# Patient Record
Sex: MALE | Race: OTHER | NOT HISPANIC OR LATINO
[De-identification: names, ages, dates, MRNs, and addresses within clinical notes are randomized per-mention and may not be internally consistent; named-entity substitution may affect disease eponyms.]

---

## 2018-12-08 ENCOUNTER — TRANSCRIPTION ENCOUNTER (OUTPATIENT)
Age: 48
End: 2018-12-08

## 2019-01-01 ENCOUNTER — TRANSCRIPTION ENCOUNTER (OUTPATIENT)
Age: 49
End: 2019-01-01

## 2020-02-24 ENCOUNTER — TRANSCRIPTION ENCOUNTER (OUTPATIENT)
Age: 50
End: 2020-02-24

## 2024-02-21 ENCOUNTER — NON-APPOINTMENT (OUTPATIENT)
Age: 54
End: 2024-02-21

## 2024-03-11 ENCOUNTER — NON-APPOINTMENT (OUTPATIENT)
Age: 54
End: 2024-03-11

## 2024-04-22 ENCOUNTER — EMERGENCY (EMERGENCY)
Facility: HOSPITAL | Age: 54
LOS: 1 days | Discharge: ANOTHER HOSPITAL | End: 2024-04-22
Attending: EMERGENCY MEDICINE | Admitting: EMERGENCY MEDICINE
Payer: COMMERCIAL

## 2024-04-22 ENCOUNTER — INPATIENT (INPATIENT)
Facility: HOSPITAL | Age: 54
LOS: 8 days | Discharge: HOME | DRG: 242 | End: 2024-05-01
Attending: INTERNAL MEDICINE | Admitting: INTERNAL MEDICINE
Payer: COMMERCIAL

## 2024-04-22 VITALS
HEIGHT: 70 IN | SYSTOLIC BLOOD PRESSURE: 117 MMHG | HEART RATE: 40 BPM | OXYGEN SATURATION: 98 % | TEMPERATURE: 98 F | DIASTOLIC BLOOD PRESSURE: 74 MMHG | WEIGHT: 190.04 LBS | RESPIRATION RATE: 16 BRPM

## 2024-04-22 VITALS
OXYGEN SATURATION: 98 % | RESPIRATION RATE: 18 BRPM | SYSTOLIC BLOOD PRESSURE: 140 MMHG | WEIGHT: 184.97 LBS | HEART RATE: 40 BPM | HEIGHT: 70 IN | DIASTOLIC BLOOD PRESSURE: 77 MMHG

## 2024-04-22 DIAGNOSIS — I44.2 ATRIOVENTRICULAR BLOCK, COMPLETE: ICD-10-CM

## 2024-04-22 LAB
ALBUMIN SERPL ELPH-MCNC: 3.3 G/DL — SIGNIFICANT CHANGE UP (ref 3.3–5)
ALBUMIN SERPL ELPH-MCNC: 4 G/DL — SIGNIFICANT CHANGE UP (ref 3.3–5)
ALP SERPL-CCNC: 69 U/L — SIGNIFICANT CHANGE UP (ref 40–120)
ALP SERPL-CCNC: 84 U/L — SIGNIFICANT CHANGE UP (ref 40–120)
ALT FLD-CCNC: 105 U/L — HIGH (ref 12–78)
ALT FLD-CCNC: 96 U/L — HIGH (ref 10–45)
ANION GAP SERPL CALC-SCNC: 12 MMOL/L — SIGNIFICANT CHANGE UP (ref 5–17)
ANION GAP SERPL CALC-SCNC: 4 MMOL/L — LOW (ref 5–17)
APTT BLD: 27.1 SEC — SIGNIFICANT CHANGE UP (ref 24.5–35.6)
AST SERPL-CCNC: 22 U/L — SIGNIFICANT CHANGE UP (ref 15–37)
AST SERPL-CCNC: 36 U/L — SIGNIFICANT CHANGE UP (ref 10–40)
BASE EXCESS BLDV CALC-SCNC: 1 MMOL/L — SIGNIFICANT CHANGE UP (ref -2–3)
BASOPHILS # BLD AUTO: 0.03 K/UL — SIGNIFICANT CHANGE UP (ref 0–0.2)
BASOPHILS # BLD AUTO: 0.04 K/UL — SIGNIFICANT CHANGE UP (ref 0–0.2)
BASOPHILS NFR BLD AUTO: 0.2 % — SIGNIFICANT CHANGE UP (ref 0–2)
BASOPHILS NFR BLD AUTO: 0.3 % — SIGNIFICANT CHANGE UP (ref 0–2)
BILIRUB SERPL-MCNC: 0.7 MG/DL — SIGNIFICANT CHANGE UP (ref 0.2–1.2)
BILIRUB SERPL-MCNC: 0.7 MG/DL — SIGNIFICANT CHANGE UP (ref 0.2–1.2)
BUN SERPL-MCNC: 11 MG/DL — SIGNIFICANT CHANGE UP (ref 7–23)
BUN SERPL-MCNC: 11 MG/DL — SIGNIFICANT CHANGE UP (ref 7–23)
CA-I SERPL-SCNC: 1.25 MMOL/L — SIGNIFICANT CHANGE UP (ref 1.15–1.33)
CALCIUM SERPL-MCNC: 8.4 MG/DL — LOW (ref 8.5–10.1)
CALCIUM SERPL-MCNC: 9.3 MG/DL — SIGNIFICANT CHANGE UP (ref 8.4–10.5)
CHLORIDE BLDV-SCNC: 108 MMOL/L — SIGNIFICANT CHANGE UP (ref 96–108)
CHLORIDE SERPL-SCNC: 110 MMOL/L — HIGH (ref 96–108)
CHLORIDE SERPL-SCNC: 113 MMOL/L — HIGH (ref 96–108)
CK MB BLD-MCNC: <1.2 % — SIGNIFICANT CHANGE UP (ref 0–3.5)
CK MB CFR SERPL CALC: 1.6 NG/ML — SIGNIFICANT CHANGE UP (ref 0–6.7)
CK MB CFR SERPL CALC: <1 NG/ML — SIGNIFICANT CHANGE UP (ref 0–3.6)
CK SERPL-CCNC: 81 U/L — SIGNIFICANT CHANGE UP (ref 26–308)
CK SERPL-CCNC: 95 U/L — SIGNIFICANT CHANGE UP (ref 30–200)
CO2 BLDV-SCNC: 26 MMOL/L — SIGNIFICANT CHANGE UP (ref 22–26)
CO2 SERPL-SCNC: 21 MMOL/L — LOW (ref 22–31)
CO2 SERPL-SCNC: 27 MMOL/L — SIGNIFICANT CHANGE UP (ref 22–31)
CREAT SERPL-MCNC: 0.92 MG/DL — SIGNIFICANT CHANGE UP (ref 0.5–1.3)
CREAT SERPL-MCNC: 1 MG/DL — SIGNIFICANT CHANGE UP (ref 0.5–1.3)
EGFR: 89 ML/MIN/1.73M2 — SIGNIFICANT CHANGE UP
EGFR: 99 ML/MIN/1.73M2 — SIGNIFICANT CHANGE UP
EOSINOPHIL # BLD AUTO: 0.21 K/UL — SIGNIFICANT CHANGE UP (ref 0–0.5)
EOSINOPHIL # BLD AUTO: 0.22 K/UL — SIGNIFICANT CHANGE UP (ref 0–0.5)
EOSINOPHIL NFR BLD AUTO: 1.4 % — SIGNIFICANT CHANGE UP (ref 0–6)
EOSINOPHIL NFR BLD AUTO: 1.7 % — SIGNIFICANT CHANGE UP (ref 0–6)
GAS PNL BLDV: 139 MMOL/L — SIGNIFICANT CHANGE UP (ref 136–145)
GAS PNL BLDV: SIGNIFICANT CHANGE UP
GLUCOSE BLDV-MCNC: 94 MG/DL — SIGNIFICANT CHANGE UP (ref 70–99)
GLUCOSE SERPL-MCNC: 85 MG/DL — SIGNIFICANT CHANGE UP (ref 70–99)
GLUCOSE SERPL-MCNC: 87 MG/DL — SIGNIFICANT CHANGE UP (ref 70–99)
HCO3 BLDV-SCNC: 25 MMOL/L — SIGNIFICANT CHANGE UP (ref 22–29)
HCT VFR BLD CALC: 42 % — SIGNIFICANT CHANGE UP (ref 39–50)
HCT VFR BLD CALC: 43.7 % — SIGNIFICANT CHANGE UP (ref 39–50)
HCT VFR BLDA CALC: 45 % — SIGNIFICANT CHANGE UP (ref 39–51)
HGB BLD CALC-MCNC: 14.9 G/DL — SIGNIFICANT CHANGE UP (ref 12.6–17.4)
HGB BLD-MCNC: 14.1 G/DL — SIGNIFICANT CHANGE UP (ref 13–17)
HGB BLD-MCNC: 14.8 G/DL — SIGNIFICANT CHANGE UP (ref 13–17)
IMM GRANULOCYTES NFR BLD AUTO: 0.5 % — SIGNIFICANT CHANGE UP (ref 0–0.9)
IMM GRANULOCYTES NFR BLD AUTO: 0.5 % — SIGNIFICANT CHANGE UP (ref 0–0.9)
INR BLD: 0.96 RATIO — SIGNIFICANT CHANGE UP (ref 0.85–1.18)
LACTATE BLDV-MCNC: 2.2 MMOL/L — HIGH (ref 0.5–2)
LACTATE SERPL-SCNC: 2.3 MMOL/L — HIGH (ref 0.5–2)
LIDOCAIN IGE QN: 17 U/L — SIGNIFICANT CHANGE UP (ref 7–60)
LIDOCAIN IGE QN: 19 U/L — SIGNIFICANT CHANGE UP (ref 13–75)
LYMPHOCYTES # BLD AUTO: 2.88 K/UL — SIGNIFICANT CHANGE UP (ref 1–3.3)
LYMPHOCYTES # BLD AUTO: 21.7 % — SIGNIFICANT CHANGE UP (ref 13–44)
LYMPHOCYTES # BLD AUTO: 21.8 % — SIGNIFICANT CHANGE UP (ref 13–44)
LYMPHOCYTES # BLD AUTO: 3.32 K/UL — HIGH (ref 1–3.3)
MAGNESIUM SERPL-MCNC: 2.2 MG/DL — SIGNIFICANT CHANGE UP (ref 1.6–2.6)
MAGNESIUM SERPL-MCNC: 2.2 MG/DL — SIGNIFICANT CHANGE UP (ref 1.6–2.6)
MCHC RBC-ENTMCNC: 30.1 PG — SIGNIFICANT CHANGE UP (ref 27–34)
MCHC RBC-ENTMCNC: 30.5 PG — SIGNIFICANT CHANGE UP (ref 27–34)
MCHC RBC-ENTMCNC: 33.6 GM/DL — SIGNIFICANT CHANGE UP (ref 32–36)
MCHC RBC-ENTMCNC: 33.9 GM/DL — SIGNIFICANT CHANGE UP (ref 32–36)
MCV RBC AUTO: 89 FL — SIGNIFICANT CHANGE UP (ref 80–100)
MCV RBC AUTO: 90.7 FL — SIGNIFICANT CHANGE UP (ref 80–100)
MONOCYTES # BLD AUTO: 0.94 K/UL — HIGH (ref 0–0.9)
MONOCYTES # BLD AUTO: 1.12 K/UL — HIGH (ref 0–0.9)
MONOCYTES NFR BLD AUTO: 7.1 % — SIGNIFICANT CHANGE UP (ref 2–14)
MONOCYTES NFR BLD AUTO: 7.3 % — SIGNIFICANT CHANGE UP (ref 2–14)
NEUTROPHILS # BLD AUTO: 10.56 K/UL — HIGH (ref 1.8–7.4)
NEUTROPHILS # BLD AUTO: 9.09 K/UL — HIGH (ref 1.8–7.4)
NEUTROPHILS NFR BLD AUTO: 68.7 % — SIGNIFICANT CHANGE UP (ref 43–77)
NEUTROPHILS NFR BLD AUTO: 68.8 % — SIGNIFICANT CHANGE UP (ref 43–77)
NRBC # BLD: 0 /100 WBCS — SIGNIFICANT CHANGE UP (ref 0–0)
NRBC # BLD: 0 /100 WBCS — SIGNIFICANT CHANGE UP (ref 0–0)
NT-PROBNP SERPL-SCNC: 504 PG/ML — HIGH (ref 0–300)
NT-PROBNP SERPL-SCNC: 531 PG/ML — HIGH (ref 0–125)
PCO2 BLDV: 38 MMHG — LOW (ref 42–55)
PH BLDV: 7.43 — SIGNIFICANT CHANGE UP (ref 7.32–7.43)
PLATELET # BLD AUTO: 174 K/UL — SIGNIFICANT CHANGE UP (ref 150–400)
PLATELET # BLD AUTO: 179 K/UL — SIGNIFICANT CHANGE UP (ref 150–400)
PO2 BLDV: 34 MMHG — SIGNIFICANT CHANGE UP (ref 25–45)
POTASSIUM BLDV-SCNC: 3.9 MMOL/L — SIGNIFICANT CHANGE UP (ref 3.5–5.1)
POTASSIUM SERPL-MCNC: 3.9 MMOL/L — SIGNIFICANT CHANGE UP (ref 3.5–5.3)
POTASSIUM SERPL-MCNC: 4.3 MMOL/L — SIGNIFICANT CHANGE UP (ref 3.5–5.3)
POTASSIUM SERPL-SCNC: 3.9 MMOL/L — SIGNIFICANT CHANGE UP (ref 3.5–5.3)
POTASSIUM SERPL-SCNC: 4.3 MMOL/L — SIGNIFICANT CHANGE UP (ref 3.5–5.3)
PROCALCITONIN SERPL-MCNC: 0.05 NG/ML — SIGNIFICANT CHANGE UP
PROT SERPL-MCNC: 5.9 G/DL — LOW (ref 6–8.3)
PROT SERPL-MCNC: 6.3 G/DL — SIGNIFICANT CHANGE UP (ref 6–8.3)
PROTHROM AB SERPL-ACNC: 11.3 SEC — SIGNIFICANT CHANGE UP (ref 9.5–13)
RBC # BLD: 4.63 M/UL — SIGNIFICANT CHANGE UP (ref 4.2–5.8)
RBC # BLD: 4.91 M/UL — SIGNIFICANT CHANGE UP (ref 4.2–5.8)
RBC # FLD: 15.2 % — HIGH (ref 10.3–14.5)
RBC # FLD: 15.3 % — HIGH (ref 10.3–14.5)
SAO2 % BLDV: 49 % — LOW (ref 67–88)
SODIUM SERPL-SCNC: 143 MMOL/L — SIGNIFICANT CHANGE UP (ref 135–145)
SODIUM SERPL-SCNC: 144 MMOL/L — SIGNIFICANT CHANGE UP (ref 135–145)
TROPONIN I, HIGH SENSITIVITY RESULT: 8 NG/L — SIGNIFICANT CHANGE UP
TROPONIN T, HIGH SENSITIVITY RESULT: 17 NG/L — SIGNIFICANT CHANGE UP (ref 0–51)
TSH SERPL-MCNC: 4.13 UIU/ML — HIGH (ref 0.36–3.74)
WBC # BLD: 13.23 K/UL — HIGH (ref 3.8–10.5)
WBC # BLD: 15.33 K/UL — HIGH (ref 3.8–10.5)
WBC # FLD AUTO: 13.23 K/UL — HIGH (ref 3.8–10.5)
WBC # FLD AUTO: 15.33 K/UL — HIGH (ref 3.8–10.5)

## 2024-04-22 PROCEDURE — 99291 CRITICAL CARE FIRST HOUR: CPT

## 2024-04-22 PROCEDURE — 80053 COMPREHEN METABOLIC PANEL: CPT

## 2024-04-22 PROCEDURE — 83690 ASSAY OF LIPASE: CPT

## 2024-04-22 PROCEDURE — 85730 THROMBOPLASTIN TIME PARTIAL: CPT

## 2024-04-22 PROCEDURE — 99223 1ST HOSP IP/OBS HIGH 75: CPT

## 2024-04-22 PROCEDURE — 93010 ELECTROCARDIOGRAM REPORT: CPT

## 2024-04-22 PROCEDURE — 99222 1ST HOSP IP/OBS MODERATE 55: CPT

## 2024-04-22 PROCEDURE — 93005 ELECTROCARDIOGRAM TRACING: CPT

## 2024-04-22 PROCEDURE — 71045 X-RAY EXAM CHEST 1 VIEW: CPT | Mod: 26,77

## 2024-04-22 PROCEDURE — 84145 PROCALCITONIN (PCT): CPT

## 2024-04-22 PROCEDURE — 71045 X-RAY EXAM CHEST 1 VIEW: CPT

## 2024-04-22 PROCEDURE — 84484 ASSAY OF TROPONIN QUANT: CPT

## 2024-04-22 PROCEDURE — 86666 EHRLICHIA ANTIBODY: CPT

## 2024-04-22 PROCEDURE — 82550 ASSAY OF CK (CPK): CPT

## 2024-04-22 PROCEDURE — 83880 ASSAY OF NATRIURETIC PEPTIDE: CPT

## 2024-04-22 PROCEDURE — 85610 PROTHROMBIN TIME: CPT

## 2024-04-22 PROCEDURE — 83735 ASSAY OF MAGNESIUM: CPT

## 2024-04-22 PROCEDURE — 36415 COLL VENOUS BLD VENIPUNCTURE: CPT

## 2024-04-22 PROCEDURE — 99285 EMERGENCY DEPT VISIT HI MDM: CPT | Mod: 25

## 2024-04-22 PROCEDURE — 82553 CREATINE MB FRACTION: CPT

## 2024-04-22 PROCEDURE — 86753 PROTOZOA ANTIBODY NOS: CPT

## 2024-04-22 PROCEDURE — 84443 ASSAY THYROID STIM HORMONE: CPT

## 2024-04-22 PROCEDURE — 85025 COMPLETE CBC W/AUTO DIFF WBC: CPT

## 2024-04-22 PROCEDURE — 86618 LYME DISEASE ANTIBODY: CPT

## 2024-04-22 PROCEDURE — 71045 X-RAY EXAM CHEST 1 VIEW: CPT | Mod: 26

## 2024-04-22 RX ORDER — CEFTRIAXONE 500 MG/1
2000 INJECTION, POWDER, FOR SOLUTION INTRAMUSCULAR; INTRAVENOUS ONCE
Refills: 0 | Status: COMPLETED | OUTPATIENT
Start: 2024-04-22 | End: 2024-04-22

## 2024-04-22 RX ADMIN — CEFTRIAXONE 100 MILLIGRAM(S): 500 INJECTION, POWDER, FOR SOLUTION INTRAMUSCULAR; INTRAVENOUS at 22:41

## 2024-04-22 NOTE — ED ADULT NURSE NOTE - OBJECTIVE STATEMENT
pt to er states he has been feeling sob for a week denies pain is alert oriented 20 angio placed in right arm labs drawn placed on cardiac monitor placed on zole pads pt to be transferred to [place]

## 2024-04-22 NOTE — ED ADULT NURSE NOTE - OBJECTIVE STATEMENT
Pt is a 54yo M w/ PMH asthma, back pain, overactive bladder presents to ED c/o chest pain, intermittent SOB. Pt is a transfer from [place] for cardiology workup as his ekg at [place] showed complete heart block. Pt states "I had the flu 5 weeks ago and took steroids and Tamiflu. I felt better, then felt like my chest hurt and something was off for about a week now. I had an ekg done a week ago that wasn't as bad as today." Denies N/V/D, weakness, dizziness, blood in stools, hematuria, sick contacts. A&Ox4. Strong peripheral pulses. Neurologically intact and follows commands. Pulse motor and sensation present and equal to all 4 extremities. Abdomen soft, distended, nontender to palpation. Pt states he's noticed bloating for 1 week and has gained about 10lbs over the last week. EKG and cardiac monitor show complete heart block. Skin warm dry intact and normal for ethnicity. Stretcher locked and in lowest position, appropriate side rails up. Pt instructed to notify RN if assistance is needed. Pt arrived with 20g in RAC. New 20g placed in Left hand on arrival to [facility].

## 2024-04-22 NOTE — ED PROVIDER NOTE - OBJECTIVE STATEMENT
53M hx asthma, chronic back pain, transferred from [facility] for complete heart block. Endorses starting to have CP/SOB about 1 week ago; was seen by his PMD/cardiologist who noted a new RBBB and referred for outpatient CTA chest for this Friday. Had worsening CP/SOB this evening at dinner and presented to [facility] where he was found to be in CHB and transferred for EP evaluation. Currently denies CP or SOB, as well as lightheadedness, syncope, recent medication changes. Had the flu recently with resolution of symptoms.

## 2024-04-22 NOTE — ED PROVIDER NOTE - WET READ LAUNCH FT
There are no Wet Read(s) to document. noninvasive blood pressure monitor There is 1 Wet Read(s) to document.

## 2024-04-22 NOTE — H&P ADULT - NSHPREVIEWOFSYSTEMS_GEN_ALL_CORE
CONSTITUTIONAL: No fever, weight loss  EYES: No eye pain, visual disturbances, or discharge  ENMT: No difficulty hearing, tinnitus, vertigo; No sinus or throat pain  RESPIRATORY: SOB. No cough, wheezing, chills or hemoptysis  CARDIOVASCULAR: No chest pain, palpitations, dizziness, or leg swelling  GASTROINTESTINAL: No abdominal or epigastric pain. No nausea, vomiting, or hematemesis; No diarrhea or constipation. No melena or hematochezia.  GENITOURINARY: No dysuria, frequency, hematuria, or incontinence  NEUROLOGICAL: No headaches, memory loss, loss of strength, numbness, or tremors  SKIN: No itching, burning, rashes, or lesions   LYMPH NODES: No enlarged glands  ENDOCRINE: No heat or cold intolerance; No hair loss  MUSCULOSKELETAL: No joint pain or swelling; No muscle, back pain  PSYCHIATRIC: No depression, anxiety, mood swings, or difficulty sleeping  HEME/LYMPH: No easy bruising, or bleeding gums

## 2024-04-22 NOTE — ED ADULT NURSE NOTE - CHIEF COMPLAINT
The patient is a 53y Male complaining of  Acitretin Pregnancy And Lactation Text: This medication is Pregnancy Category X and should not be given to women who are pregnant or may become pregnant in the future. This medication is excreted in breast milk.

## 2024-04-22 NOTE — ED PROVIDER NOTE - PROGRESS NOTE DETAILS
Robin Garcia, PGY3 Patient vitals are stable.  Will give ceftriaxone for Lyme prophylaxis.  She was seen by EP cards at bedside.  Patient okay for telemetry floors per the team.

## 2024-04-22 NOTE — ED PROVIDER NOTE - DIFFERENTIAL DIAGNOSIS
Patient presenting to the emergency room and complete heart block.  Will obtain screening labs check tick panel and cardiac enzymes magnesium.  Will place external pacer pads and consult with cardiology for possible transfer. Differential Diagnosis

## 2024-04-22 NOTE — CONSULT NOTE ADULT - ATTENDING COMMENTS
Symptomatic CHB with RBBB escape. Awaiting Lyme titers. Cannot undergo MRI. WIll plan for CT to evaluate for lymphadenopathy, r/o sarcoid. Await TTE. Obtain outpt records from cardiologist.

## 2024-04-22 NOTE — ED PROVIDER NOTE - PROGRESS NOTE DETAILS
Robin Garcia, PGY3 Patient remains well-appearing at bedside.  Vitals are stable.  Patient was seen by cardiology at bedside.  okay for patient to go on telemetry floor as

## 2024-04-22 NOTE — CONSULT NOTE ADULT - ASSESSMENT
53M w/ PMH of asthma presenting with CHB w/ RBBB escape. There is a low to intermediate probability of Lyme carditis, but would empirically treat for now while pending titers. Currently asymptomatic.    Recs:  -Monitor on telemetry  -start ceftriaxone 2g IV daily  -send Lyme titers, T3, T4  -repeat lactate  -obtain TTE  -low threshold to transfer to CCU for TVP if patient develops dizziness, worsening bradycardia, or hemodynamic instability 53M w/ PMH of asthma presenting with CHB w/ RBBB escape. There is a low to intermediate probability of Lyme carditis, but would empirically treat for now while pending titers. Currently asymptomatic.    Recs:  -Monitor on telemetry  -start ceftriaxone 2g IV daily  -send Lyme titers, T3, T4  -repeat lactate  -obtain TTE  -keep NPO at MN  -low threshold to transfer to CCU for TVP if patient develops dizziness, worsening bradycardia, or hemodynamic instability

## 2024-04-22 NOTE — ED ADULT NURSE NOTE - NSFALLUNIVINTERV_ED_ALL_ED
Bed/Stretcher in lowest position, wheels locked, appropriate side rails in place/Call bell, personal items and telephone in reach/Instruct patient to call for assistance before getting out of bed/chair/stretcher/Non-slip footwear applied when patient is off stretcher/[place] to call system/Physically safe environment - no spills, clutter or unnecessary equipment/Purposeful proactive rounding/Room/bathroom lighting operational, light cord in reach

## 2024-04-22 NOTE — ED PROVIDER NOTE - PHYSICAL EXAMINATION
small contusion noted to right abd and right lower chest wall pt reports occurred when lifting something heavy

## 2024-04-22 NOTE — H&P ADULT - HISTORY OF PRESENT ILLNESS
Pt is a 54yo M w/ PMH of asthma, chronic back pain pw intermittent chest discomfort and SOB.    Reports ~1 week's time of SOB and dizziness a/w intermittent chest discomfort for which he saw his PMD/cardiologist and underwent ECG notable for RBBB. Given his recent travel to [place] about a month ago he was advised to f/u w/ CTA PE protocol outpt to r/o PE given his symptoms which he has not gotten yet. On day of presentation he was at friend's home to and became very SOB and felt unwell when walking in and thus presented to [place] where he was noted to be in CHB and thus tx here to [facility].     He otherwise endorses having the flu about a month ago s/p tamiflu and a recent rash which he describes as blotches on his upper and lower extremities for which he saw a dermatologist and underwent 2 tapers of steroids which helped. He otherwise denies recent hiking though does report bike riding in the park, last in October of last year. Not on AV darcy blockade     In the ED darryl w/ mild leukocytosis to 15 s/p CTX 2g

## 2024-04-22 NOTE — ED PROVIDER NOTE - CLINICAL SUMMARY MEDICAL DECISION MAKING FREE TEXT BOX
Patient is a 53-year-old gentleman who presents to the emergency room with a chief complaint of feeling shortness of breath with associated intermittent chest discomfort.  Past medical history of asthma history of urinary frequency history of chronic back issues.  He reports that for the last week he has been experiencing intermittent chest discomfort and shortness of breath.  He did follow-up with his primary care physician earlier in the week had a EKG which was changed from his baseline possible underlying right bundle branch block.  Is primary is concerned about a possible PE.  Schedule an outpatient CT appointment for Friday.  Reports today at some more shortness of breath so he came to the emergency room.  Denies any fevers chills nausea or vomiting.  Does feel bloated.  Reports that a month ago he had flu finished a course of Tamiflu.    Independent review of EKG reveals complete heart block at 41 bpm Patient is a 53-year-old gentleman who presents to the emergency room with a chief complaint of feeling shortness of breath with associated intermittent chest discomfort.  Past medical history of asthma history of urinary frequency history of chronic back issues.  He reports that for the last week he has been experiencing intermittent chest discomfort and shortness of breath.  He did follow-up with his primary care physician earlier in the week had a EKG which was changed from his baseline possible underlying right bundle branch block.  His primary is concerned about a possible PE.  Scheduled an outpatient CT appointment for Friday.  Reports today at some more shortness of breath so he came to the emergency room.  Denies any fevers chills nausea or vomiting.  Does feel bloated.  Reports that a month ago he had flu finished a course of Tamiflu. Patient also reports a recent rash pruritic in nature for which he was treated with a course of steroids. Denies calf pain or swelling.  Patient presenting to the emergency room and complete heart block.  Will obtain screening labs check tick panel and cardiac enzymes magnesium.  Will place external pacer pads and consult with cardiology for possible transfer. Independent review of EKG reveals complete heart block at 41 bpm. Results of labs reviewed patient with a white count of 13 downtrending likely result of steroids.  Coags noted CMP noted troponin within normal mag within normal proBNP 531.  Independent review of chest x-ray reveals no acute infiltrate.  Blood pressure remained stable.  Patient excepted to [place] tier 1 transfer.

## 2024-04-22 NOTE — PATIENT PROFILE ADULT - FALL HARM RISK - HARM RISK INTERVENTIONS

## 2024-04-22 NOTE — H&P ADULT - NSHPLABSRESULTS_GEN_ALL_CORE
LABS:                      14.8   15.33 )-----------( 174      ( 22 Apr 2024 20:29 )             43.7     04-22    143  |  110<H>  |  11  ----------------------------<  85  4.3   |  21<L>  |  0.92    Ca    9.3      22 Apr 2024 20:29  Mg     2.2     04-22    TPro  6.3  /  Alb  4.0  /  TBili  0.7  /  DBili  x   /  AST  36  /  ALT  96<H>  /  AlkPhos  84  04-22    CARDIAC MARKERS ( 22 Apr 2024 20:29 )  x     / x     / 95 U/L / x     / 1.6 ng/mL  CARDIAC MARKERS ( 22 Apr 2024 18:50 )  x     / x     / 81 U/L / x     / <1.0 ng/mL    LIVER FUNCTIONS - ( 22 Apr 2024 20:29 )  Alb: 4.0 g/dL / Pro: 6.3 g/dL / ALK PHOS: 84 U/L / ALT: 96 U/L / AST: 36 U/L / GGT: x           PT/INR - ( 22 Apr 2024 18:50 )   PT: 11.3 sec;   INR: 0.96 ratio    PTT - ( 22 Apr 2024 18:50 )  PTT:27.1 sec    Urinalysis Basic - ( 22 Apr 2024 20:29 )  Color: x / Appearance: x / SG: x / pH: x  Gluc: 85 mg/dL / Ketone: x  / Bili: x / Urobili: x   Blood: x / Protein: x / Nitrite: x   Leuk Esterase: x / RBC: x / WBC x   Sq Epi: x / Non Sq Epi: x / Bacteria: x    IMAGING:  Xray Chest 1 View- PORTABLE-Urgent (04.22.24 @ 21:07)  IMPRESSION:  - Clear lungs.  ******PRELIMINARY REPORT******

## 2024-04-22 NOTE — H&P ADULT - ASSESSMENT
54yo M w/ PMH of asthma, chronic back pain pw intermittent chest discomfort and SOB found to be in CHB

## 2024-04-22 NOTE — H&P ADULT - NSHPPHYSICALEXAM_GEN_ALL_CORE
Vital Signs Last 24 Hrs  T(C): 37.1 (23 Apr 2024 00:10), Max: 37.1 (22 Apr 2024 21:08)  T(F): 98.8 (23 Apr 2024 00:10), Max: 98.8 (23 Apr 2024 00:10)  HR: 41 (23 Apr 2024 00:10) (40 - 73)  BP: 108/59 (23 Apr 2024 00:10) (108/59 - 143/63)  BP(mean): 84 (22 Apr 2024 20:29) (84 - 84)  RR: 18 (23 Apr 2024 00:10) (15 - 18)  SpO2: 95% (23 Apr 2024 00:10) (95% - 98%)    Parameters below as of 23 Apr 2024 00:10  Patient On (Oxygen Delivery Method): room air    CONSTITUTIONAL: Well-groomed, in no apparent distress  EYES: No conjunctival or scleral injection, non-icteric;   ENMT: No external nasal lesions; MMM  NECK: Trachea midline without palpable neck mass; thyroid not enlarged and non-tender  RESPIRATORY: Breathing comfortably; no dullness to percussion; lungs CTA without wheeze/rhonchi/rales  CARDIOVASCULAR: +S1S2, RRR, no M/G/R; pedal pulses full and symmetric; no lower extremity edema  GASTROINTESTINAL: No palpable masses or tenderness, +BS throughout, no rebound/guarding; no hepatosplenomegaly; no hernia palpated  LYMPHATIC: No cervical LAD or tenderness  SKIN: No rashes or ulcers noted  NEUROLOGIC: CN II-XII intact; sensation intact in LEs b/l to light touch  PSYCHIATRIC: A&Ox3; mood and affect appropriate; appropriate insight and judgment

## 2024-04-22 NOTE — ED PROVIDER NOTE - OBJECTIVE STATEMENT
[facility]    If you have any questions regarding to your visit please contact your care team:       Team Purple:   Clinic Hours Telephone Number   Dr. Cris Vences   7am-7pm  Monday - Thursday   7am-5pm  Fridays  [phone]  (Appointment scheduling available 24/7)   Urgent Care - [place] and [facility] - 11am-9pm Monday-Friday Saturday-Sunday- 9am-5pm   [place] - 5pm-9pm Monday-Friday Saturday-Sunday- 9am-5pm  [phone] - [place]  [phone] - [place]       What options do I have for a visit other than an office visit? We offer electronic visits (e-visits) and telephone visits, when medically appropriate.  Please check with your medical insurance to see if these types of visits are covered, as you will be responsible for any charges that are not paid by your insurance.      You can use [employer] (secure electronic communication) to access to your chart, send your primary care provider a message, or make an appointment. Ask a team member how to get started.     For a price quote for your services, please call our Consumer Price Line at [phone] or our Imaging Cost estimation line at [phone] (for imaging tests).       Patient is a 53-year-old gentleman who presents to the emergency room with a chief complaint of feeling shortness of breath with associated intermittent chest discomfort.  Past medical history of asthma history of urinary frequency history of chronic back issues.  He reports that for the last week he has been experiencing intermittent chest discomfort and shortness of breath.  He did follow-up with his primary care physician earlier in the week had a EKG which was changed from his baseline possible underlying right bundle branch block.  Is primary is concerned about a possible PE.  Schedule an outpatient CT appointment for Friday.  Reports today at some more shortness of breath so he came to the emergency room.  Denies any fevers chills nausea or vomiting.  Does feel bloated.  Reports that a month ago he had flu finished a course of Tamiflu. Patient is a 53-year-old gentleman who presents to the emergency room with a chief complaint of feeling shortness of breath with associated intermittent chest discomfort.  Past medical history of asthma history of urinary frequency history of chronic back issues.  He reports that for the last week he has been experiencing intermittent chest discomfort and shortness of breath.  He did follow-up with his primary care physician earlier in the week had a EKG which was changed from his baseline possible underlying right bundle branch block.  His primary is concerned about a possible PE.  Scheduled an outpatient CT appointment for Friday.  Reports today at some more shortness of breath so he came to the emergency room.  Denies any fevers chills nausea or vomiting.  Does feel bloated. Denies calf pain or swelling. Reports that a month ago he had flu finished a course of Tamiflu. Patient also reports a recent rash pruritic in nature for which she was treated with a course of steroids.

## 2024-04-22 NOTE — ED PROVIDER NOTE - CRITICAL CARE ATTENDING CONTRIBUTION TO CARE
Patient is a 53-year-old gentleman who presents to the emergency room with a chief complaint of feeling shortness of breath with associated intermittent chest discomfort.  Past medical history of asthma history of urinary frequency history of chronic back issues.  He reports that for the last week he has been experiencing intermittent chest discomfort and shortness of breath.  He did follow-up with his primary care physician earlier in the week had a EKG which was changed from his baseline possible underlying right bundle branch block.  His primary is concerned about a possible PE.  Schedule an outpatient CT appointment for Friday.  Reports today at some more shortness of breath so he came to the emergency room.  Denies any fevers chills nausea or vomiting.  Does feel bloated. Denies calf pain or swelling. Reports that a month ago he had flu finished a course of Tamiflu. Patient also reports a recent rash pruritic in nature for which she was treated with a course of steroids. Patient presenting to the emergency room and complete heart block.  Will obtain screening labs check tick panel and cardiac enzymes magnesium.  Will place external pacer pads and consult with cardiology for possible transfer. Independent review of EKG reveals complete heart block at 41 bpm. Results of labs reviewed patient with a white count of 13 downtrending likely result of steroids.  Coags noted CMP noted troponin within normal mag within normal proBNP 531.  Independent review of chest x-ray reveals no acute infiltrate.  Blood pressure remained stable.  Patient excepted to [place] tier 1 transfer.

## 2024-04-22 NOTE — H&P ADULT - PROBLEM SELECTOR PLAN 1
- Unclear etiology, not on inducing medications or had recent cardiac surgeries and no s/s of ACS  - Possible lyme carditis vs idiopathic  - Monitor on tele  - TTE in AM  - Start CTX 2g qd for now for lyme pending titers   - NPO for now for potential PPM  - EP following, recs appreciated  - Low threshold for CCU for TVP if symptomatic, worsening bradycardia or w/ hemodynamic instability

## 2024-04-22 NOTE — ED PROVIDER NOTE - CLINICAL SUMMARY MEDICAL DECISION MAKING FREE TEXT BOX
I was the supervising attending. I have independently seen face-to-face and examined the patient. I have reviewed the history and physical and discussed the MDM with the resident, fellow, MAHESH and/or student. I agree with the assessment and plan as presented unless otherwise documented as follows:    53M presenting w/ CHB as transfer from [facility]. Normal mental status & BP, EKG w/ RBBB and CHB. EP at bedside, recommending tele monitoring with plan for likely PPM in the morning, further w/u of new CHB, feel patient is stable for floor. Will send repeat labs. Tickborne illness panel sent by [facility] team prior to transfer, EP here recommending empiric ceftriaxone. -Adrienne Maynard MD (Attending)

## 2024-04-22 NOTE — ED PROVIDER NOTE - PHYSICAL EXAMINATION
Patient Education        Colonoscopy: What to Expect at Home  Your Recovery  After you have a colonoscopy, you will stay at the clinic for 1 to 2 hours until the medicines wear off. Then you can go home. But you will need to arrange for a ride. Your doctor will tell you when you can eat and do your other usual activities. Your doctor will talk to you about when you will need your next colonoscopy. Your doctor can help you decide how often you need to be checked. This will depend on the results of your test and your risk for colorectal cancer. After the test, you may be bloated or have gas pains. You may need to pass gas. If a biopsy was done or a polyp was removed, you may have streaks of blood in your stool (feces) for a few days. Problems such as heavy rectal bleeding may not occur until several weeks after the test. This isn't common. But it can happen after polyps are removed. This care sheet gives you a general idea about how long it will take for you to recover. But each person recovers at a different pace. Follow the steps below to get better as quickly as possible. How can you care for yourself at home? Activity    · Rest when you feel tired.     · You can do your normal activities when it feels okay to do so. Diet    · Follow your doctor's directions for eating.     · Unless your doctor has told you not to, drink plenty of fluids. This helps to replace the fluids that were lost during the colon prep.     · Do not drink alcohol. Medicines    · Your doctor will tell you if and when you can restart your medicines. He or she will also give you instructions about taking any new medicines.     · If you take blood thinners, such as warfarin (Coumadin), clopidogrel (Plavix), or aspirin, be sure to talk to your doctor. He or she will tell you if and when to start taking those medicines again.  Make sure that you understand exactly what your doctor wants you to do.     · If polyps were removed or a biopsy was done during the test, your doctor may tell you not to take aspirin or other anti-inflammatory medicines for a few days. These include ibuprofen (Advil, Motrin) and naproxen (Aleve). Other instructions    · For your safety, do not drive or operate machinery until the medicine wears off and you can think clearly. Your doctor may tell you not to drive or operate machinery until the day after your test.     · Do not sign legal documents or make major decisions until the medicine wears off and you can think clearly. The anesthesia can make it hard for you to fully understand what you are agreeing to. Follow-up care is a key part of your treatment and safety. Be sure to make and go to all appointments, and call your doctor if you are having problems. It's also a good idea to know your test results and keep a list of the medicines you take. When should you call for help? Call 911 anytime you think you may need emergency care. For example, call if:    · You passed out (lost consciousness).     · You pass maroon or bloody stools.     · You have trouble breathing.    Call your doctor now or seek immediate medical care if:    · You have pain that does not get better after you take pain medicine.     · You are sick to your stomach or cannot drink fluids.     · You have new or worse belly pain.     · You have blood in your stools.     · You have a fever.     · You cannot pass stools or gas.    Watch closely for changes in your health, and be sure to contact your doctor if you have any problems. Where can you learn more? Go to http://henrry-alexis.info/. Enter E264 in the search box to learn more about \"Colonoscopy: What to Expect at Home. \"  Current as of: March 27, 2018  Content Version: 11.9  © 0635-8384 [employer]. Care instructions adapted under license by [employer] (which disclaims liability or warranty for this information).  If you have questions about a medical condition or this instruction, always ask your healthcare professional. [place] [address] any warranty or liability for your use of this information. Gastrointestinal Colonoscopy/Flexible Sigmoidoscopy  Lower Exam Discharge Instructions    1. Call your doctor for any problems or questions. 2. Contact the doctors office for follow up appointment as directed  3. Medication may cause drowsiness for several hours, therefore, do not drive or operate machinery for remainder of the day. 4. No alcohol today. 5. Ordinarily, you may resume regular diet and activity after exam unless otherwise specified by your physician. 6. Because of air put into your colon during exam, you may experience some abdominal distension, relieved by the passage of gas, for several hours. 7. Contact your physician if you have any of the following:  a. Excessive amount of bleeding - large amount when having a bowel movement. Occasional specks of blood with bowel movement would not be unusual.  b. Severe abdominal pain  c.  Fever or Chills GEN: awake and alert, well-appearing, no acute distress  SKIN: (+) warm/dry, (-) cyanosis, (-) rash  HEAD: (-) scalp swelling, (-) tenderness  EYES: (-) conjunctival pallor, (-) scleral icterus  ENMT: (+) moist mucous membranes, (+) airway patent, (-) stridor  NECK: (-) tenderness, (-) stiffness  CV: (+) bradycardic, (-) murmurs/rubs/gallops  RESP: (+) CTABL, (-) increased WOB, (-) rales, (-) rhonchi, (-) wheezing  ABD: (+) soft, (-) tenderness, (-) guarding  EXT: (-) joint deformities, (-) edema, (-) tenderness, (+) grossly intact ROM, (+) equal pulses in upper & lower extremities  NEURO: mental status as above, (-) gross strength/sensation deficits

## 2024-04-23 ENCOUNTER — RESULT REVIEW (OUTPATIENT)
Age: 54
End: 2024-04-23

## 2024-04-23 ENCOUNTER — APPOINTMENT (OUTPATIENT)
Dept: PULMONOLOGY | Facility: CLINIC | Age: 54
End: 2024-04-23

## 2024-04-23 DIAGNOSIS — M54.9 DORSALGIA, UNSPECIFIED: ICD-10-CM

## 2024-04-23 DIAGNOSIS — Z90.49 ACQUIRED ABSENCE OF OTHER SPECIFIED PARTS OF DIGESTIVE TRACT: Chronic | ICD-10-CM

## 2024-04-23 DIAGNOSIS — Z79.899 OTHER LONG TERM (CURRENT) DRUG THERAPY: ICD-10-CM

## 2024-04-23 DIAGNOSIS — Z29.9 ENCOUNTER FOR PROPHYLACTIC MEASURES, UNSPECIFIED: ICD-10-CM

## 2024-04-23 DIAGNOSIS — I44.2 ATRIOVENTRICULAR BLOCK, COMPLETE: ICD-10-CM

## 2024-04-23 DIAGNOSIS — J45.909 UNSPECIFIED ASTHMA, UNCOMPLICATED: ICD-10-CM

## 2024-04-23 DIAGNOSIS — I10 ESSENTIAL (PRIMARY) HYPERTENSION: Chronic | ICD-10-CM

## 2024-04-23 LAB
ALBUMIN SERPL ELPH-MCNC: 3.6 G/DL — SIGNIFICANT CHANGE UP (ref 3.3–5)
ALP SERPL-CCNC: 73 U/L — SIGNIFICANT CHANGE UP (ref 40–120)
ALT FLD-CCNC: 79 U/L — HIGH (ref 10–45)
ANION GAP SERPL CALC-SCNC: 13 MMOL/L — SIGNIFICANT CHANGE UP (ref 5–17)
AST SERPL-CCNC: 23 U/L — SIGNIFICANT CHANGE UP (ref 10–40)
B BURGDOR C6 AB SER-ACNC: NEGATIVE — SIGNIFICANT CHANGE UP
B BURGDOR IGG+IGM SER-ACNC: 0.07 INDEX — SIGNIFICANT CHANGE UP (ref 0.01–0.89)
BASOPHILS # BLD AUTO: 0.05 K/UL — SIGNIFICANT CHANGE UP (ref 0–0.2)
BASOPHILS NFR BLD AUTO: 0.4 % — SIGNIFICANT CHANGE UP (ref 0–2)
BILIRUB SERPL-MCNC: 0.4 MG/DL — SIGNIFICANT CHANGE UP (ref 0.2–1.2)
BUN SERPL-MCNC: 10 MG/DL — SIGNIFICANT CHANGE UP (ref 7–23)
CALCIUM SERPL-MCNC: 8.9 MG/DL — SIGNIFICANT CHANGE UP (ref 8.4–10.5)
CHLORIDE SERPL-SCNC: 111 MMOL/L — HIGH (ref 96–108)
CO2 SERPL-SCNC: 18 MMOL/L — LOW (ref 22–31)
CREAT SERPL-MCNC: 0.9 MG/DL — SIGNIFICANT CHANGE UP (ref 0.5–1.3)
EGFR: 101 ML/MIN/1.73M2 — SIGNIFICANT CHANGE UP
EOSINOPHIL # BLD AUTO: 0.27 K/UL — SIGNIFICANT CHANGE UP (ref 0–0.5)
EOSINOPHIL NFR BLD AUTO: 2.2 % — SIGNIFICANT CHANGE UP (ref 0–6)
FT4I SERPL CALC-MCNC: 2.1 FTI% — SIGNIFICANT CHANGE UP (ref 1.4–4.8)
FT4I SERPL CALC-MCNC: 5.7 INDEX — SIGNIFICANT CHANGE UP (ref 4.4–11.4)
GLUCOSE SERPL-MCNC: 89 MG/DL — SIGNIFICANT CHANGE UP (ref 70–99)
HCT VFR BLD CALC: 40.9 % — SIGNIFICANT CHANGE UP (ref 39–50)
HGB BLD-MCNC: 13.7 G/DL — SIGNIFICANT CHANGE UP (ref 13–17)
IMM GRANULOCYTES NFR BLD AUTO: 0.6 % — SIGNIFICANT CHANGE UP (ref 0–0.9)
LACTATE SERPL-SCNC: 1.5 MMOL/L — SIGNIFICANT CHANGE UP (ref 0.5–2)
LYMPHOCYTES # BLD AUTO: 2.85 K/UL — SIGNIFICANT CHANGE UP (ref 1–3.3)
LYMPHOCYTES # BLD AUTO: 22.7 % — SIGNIFICANT CHANGE UP (ref 13–44)
MAGNESIUM SERPL-MCNC: 2.1 MG/DL — SIGNIFICANT CHANGE UP (ref 1.6–2.6)
MCHC RBC-ENTMCNC: 30.2 PG — SIGNIFICANT CHANGE UP (ref 27–34)
MCHC RBC-ENTMCNC: 33.5 GM/DL — SIGNIFICANT CHANGE UP (ref 32–36)
MCV RBC AUTO: 90.3 FL — SIGNIFICANT CHANGE UP (ref 80–100)
MONOCYTES # BLD AUTO: 0.95 K/UL — HIGH (ref 0–0.9)
MONOCYTES NFR BLD AUTO: 7.6 % — SIGNIFICANT CHANGE UP (ref 2–14)
NEUTROPHILS # BLD AUTO: 8.35 K/UL — HIGH (ref 1.8–7.4)
NEUTROPHILS NFR BLD AUTO: 66.5 % — SIGNIFICANT CHANGE UP (ref 43–77)
NRBC # BLD: 0 /100 WBCS — SIGNIFICANT CHANGE UP (ref 0–0)
PHOSPHATE SERPL-MCNC: 2.7 MG/DL — SIGNIFICANT CHANGE UP (ref 2.5–4.5)
PLATELET # BLD AUTO: 172 K/UL — SIGNIFICANT CHANGE UP (ref 150–400)
POTASSIUM SERPL-MCNC: 4 MMOL/L — SIGNIFICANT CHANGE UP (ref 3.5–5.3)
POTASSIUM SERPL-SCNC: 4 MMOL/L — SIGNIFICANT CHANGE UP (ref 3.5–5.3)
PROT SERPL-MCNC: 5.6 G/DL — LOW (ref 6–8.3)
RBC # BLD: 4.53 M/UL — SIGNIFICANT CHANGE UP (ref 4.2–5.8)
RBC # FLD: 15.4 % — HIGH (ref 10.3–14.5)
SODIUM SERPL-SCNC: 142 MMOL/L — SIGNIFICANT CHANGE UP (ref 135–145)
T3/T3 UPTAKE INDEX SERPL-RTO: 36 % — SIGNIFICANT CHANGE UP (ref 28–41)
T4 AB SER-ACNC: 5.7 UG/DL — SIGNIFICANT CHANGE UP (ref 4.6–12)
T4/T3 UPTAKE INDEX SERPL: 1 TBI — SIGNIFICANT CHANGE UP (ref 0.8–1.3)
TSH SERPL-MCNC: 4.39 UIU/ML — HIGH (ref 0.27–4.2)
WBC # BLD: 12.54 K/UL — HIGH (ref 3.8–10.5)
WBC # FLD AUTO: 12.54 K/UL — HIGH (ref 3.8–10.5)

## 2024-04-23 PROCEDURE — 93306 TTE W/DOPPLER COMPLETE: CPT | Mod: 26

## 2024-04-23 PROCEDURE — 76376 3D RENDER W/INTRP POSTPROCES: CPT | Mod: 26

## 2024-04-23 PROCEDURE — 99223 1ST HOSP IP/OBS HIGH 75: CPT

## 2024-04-23 PROCEDURE — 99233 SBSQ HOSP IP/OBS HIGH 50: CPT

## 2024-04-23 PROCEDURE — 71250 CT THORAX DX C-: CPT | Mod: 26

## 2024-04-23 RX ORDER — TIZANIDINE 4 MG/1
0 TABLET ORAL
Refills: 0 | DISCHARGE

## 2024-04-23 RX ORDER — MONTELUKAST 4 MG/1
1 TABLET, CHEWABLE ORAL
Refills: 0 | DISCHARGE

## 2024-04-23 RX ORDER — GABAPENTIN 400 MG/1
600 CAPSULE ORAL THREE TIMES A DAY
Refills: 0 | Status: DISCONTINUED | OUTPATIENT
Start: 2024-04-23 | End: 2024-05-01

## 2024-04-23 RX ORDER — GABAPENTIN 400 MG/1
1 CAPSULE ORAL
Refills: 0 | DISCHARGE

## 2024-04-23 RX ORDER — CALAMINE AND ZINC OXIDE AND PHENOL 160; 10 MG/ML; MG/ML
1 LOTION TOPICAL
Refills: 0 | Status: DISCONTINUED | OUTPATIENT
Start: 2024-04-23 | End: 2024-05-01

## 2024-04-23 RX ORDER — MONTELUKAST 4 MG/1
10 TABLET, CHEWABLE ORAL DAILY
Refills: 0 | Status: DISCONTINUED | OUTPATIENT
Start: 2024-04-23 | End: 2024-05-01

## 2024-04-23 RX ORDER — CEFTRIAXONE 500 MG/1
2000 INJECTION, POWDER, FOR SOLUTION INTRAMUSCULAR; INTRAVENOUS EVERY 24 HOURS
Refills: 0 | Status: DISCONTINUED | OUTPATIENT
Start: 2024-04-23 | End: 2024-04-25

## 2024-04-23 RX ORDER — MIRABEGRON 50 MG/1
1 TABLET, EXTENDED RELEASE ORAL
Refills: 0 | DISCHARGE

## 2024-04-23 RX ORDER — BUDESONIDE AND FORMOTEROL FUMARATE DIHYDRATE 160; 4.5 UG/1; UG/1
2 AEROSOL RESPIRATORY (INHALATION)
Refills: 0 | DISCHARGE

## 2024-04-23 RX ORDER — SODIUM CHLORIDE 9 MG/ML
1000 INJECTION, SOLUTION INTRAVENOUS
Refills: 0 | Status: DISCONTINUED | OUTPATIENT
Start: 2024-04-23 | End: 2024-04-24

## 2024-04-23 RX ORDER — MIRABEGRON 50 MG/1
25 TABLET, EXTENDED RELEASE ORAL DAILY
Refills: 0 | Status: DISCONTINUED | OUTPATIENT
Start: 2024-04-23 | End: 2024-05-01

## 2024-04-23 RX ORDER — ACETAMINOPHEN 500 MG
650 TABLET ORAL EVERY 6 HOURS
Refills: 0 | Status: DISCONTINUED | OUTPATIENT
Start: 2024-04-23 | End: 2024-05-01

## 2024-04-23 RX ORDER — FEXOFENADINE HCL AND PSEUDOEPHEDRINE HCI 60; 120 MG/1; MG/1
1 TABLET, EXTENDED RELEASE ORAL
Refills: 0 | DISCHARGE

## 2024-04-23 RX ORDER — BUDESONIDE AND FORMOTEROL FUMARATE DIHYDRATE 160; 4.5 UG/1; UG/1
2 AEROSOL RESPIRATORY (INHALATION)
Refills: 0 | Status: DISCONTINUED | OUTPATIENT
Start: 2024-04-23 | End: 2024-05-01

## 2024-04-23 RX ADMIN — MIRABEGRON 25 MILLIGRAM(S): 50 TABLET, EXTENDED RELEASE ORAL at 21:14

## 2024-04-23 RX ADMIN — SODIUM CHLORIDE 60 MILLILITER(S): 9 INJECTION, SOLUTION INTRAVENOUS at 07:04

## 2024-04-23 RX ADMIN — GABAPENTIN 600 MILLIGRAM(S): 400 CAPSULE ORAL at 13:32

## 2024-04-23 RX ADMIN — BUDESONIDE AND FORMOTEROL FUMARATE DIHYDRATE 2 PUFF(S): 160; 4.5 AEROSOL RESPIRATORY (INHALATION) at 05:24

## 2024-04-23 RX ADMIN — MONTELUKAST 10 MILLIGRAM(S): 4 TABLET, CHEWABLE ORAL at 13:31

## 2024-04-23 RX ADMIN — CEFTRIAXONE 100 MILLIGRAM(S): 500 INJECTION, POWDER, FOR SOLUTION INTRAMUSCULAR; INTRAVENOUS at 21:13

## 2024-04-23 RX ADMIN — GABAPENTIN 600 MILLIGRAM(S): 400 CAPSULE ORAL at 05:25

## 2024-04-23 RX ADMIN — BUDESONIDE AND FORMOTEROL FUMARATE DIHYDRATE 2 PUFF(S): 160; 4.5 AEROSOL RESPIRATORY (INHALATION) at 18:40

## 2024-04-23 RX ADMIN — GABAPENTIN 600 MILLIGRAM(S): 400 CAPSULE ORAL at 21:14

## 2024-04-23 NOTE — PROGRESS NOTE ADULT - SUBJECTIVE AND OBJECTIVE BOX
date of service: 04-23-24 @ 06:40  afberile  REVIEW OF SYSTEMS:  CONSTITUTIONAL: No fever,  no  weight loss  ENT:  No  tinnitus,   no   vertigo  NECK: No pain or stiffness  RESPIRATORY: No cough, wheezing, chills or hemoptysis;    No Shortness of Breath  CARDIOVASCULAR: No chest pain, palpitations, dizziness  GASTROINTESTINAL: No abdominal or epigastric pain. No nausea, vomiting, or hematemesis; No diarrhea  No melena or hematochezia.  GENITOURINARY: No dysuria, frequency, hematuria, or incontinence  NEUROLOGICAL: No headaches  SKIN: No itching,  no   rash  LYMPH Nodes: No enlarged glands  ENDOCRINE: No heat or cold intolerance  MUSCULOSKELETAL: No joint pain or swelling  PSYCHIATRIC: No depression, anxiety  HEME/LYMPH: No easy bruising, or bleeding gums  ALLERGY AND IMMUNOLOGIC: No hives or eczema	    MEDICATIONS  (STANDING):  budesonide 160 MICROgram(s)/formoterol 4.5 MICROgram(s) Inhaler 2 Puff(s) Inhalation two times a day  cefTRIAXone   IVPB 2000 milliGRAM(s) IV Intermittent every 24 hours  gabapentin 600 milliGRAM(s) Oral three times a day  mirabegron ER 25 milliGRAM(s) Oral daily  montelukast 10 milliGRAM(s) Oral daily    MEDICATIONS  (PRN):  acetaminophen     Tablet .. 650 milliGRAM(s) Oral every 6 hours PRN Temp greater or equal to 38C (100.4F), Mild Pain (1 - 3)      Vital Signs Last 24 Hrs  T(C): 36.9 (23 Apr 2024 04:25), Max: 37.1 (22 Apr 2024 21:08)  T(F): 98.4 (23 Apr 2024 04:25), Max: 98.8 (23 Apr 2024 00:10)  HR: 39 (23 Apr 2024 04:25) (39 - 73)  BP: 103/62 (23 Apr 2024 04:25) (103/62 - 143/63)  BP(mean): 84 (22 Apr 2024 20:29) (84 - 84)  RR: 18 (23 Apr 2024 04:25) (15 - 18)  SpO2: 99% (23 Apr 2024 04:25) (95% - 99%)    Parameters below as of 23 Apr 2024 04:25  Patient On (Oxygen Delivery Method): room air      CAPILLARY BLOOD GLUCOSE        I&O's Summary    22 Apr 2024 07:01  -  23 Apr 2024 06:40  --------------------------------------------------------  IN: 0 mL / OUT: 500 mL / NET: -500 mL          Appearance: Normal	  HEENT:   Normal oral mucosa, PERRL, EOMI	  Lymphatic: No lymphadenopathy  Cardiovascular: Normal S1 S2, No JVD  Respiratory: Lungs clear to auscultation	  Gastrointestinal:  Soft, Non-tender, + BS	  Skin: No rash, No ecchymoses	  Extremities:     LABS:                        14.8   15.33 )-----------( 174      ( 22 Apr 2024 20:29 )             43.7     04-22    143  |  110<H>  |  11  ----------------------------<  85  4.3   |  21<L>  |  0.92    Ca    9.3      22 Apr 2024 20:29  Mg     2.2     04-22    TPro  6.3  /  Alb  4.0  /  TBili  0.7  /  DBili  x   /  AST  36  /  ALT  96<H>  /  AlkPhos  84  04-22    PT/INR - ( 22 Apr 2024 18:50 )   PT: 11.3 sec;   INR: 0.96 ratio         PTT - ( 22 Apr 2024 18:50 )  PTT:27.1 sec  CARDIAC MARKERS ( 22 Apr 2024 20:29 )  x     / x     / 95 U/L / x     / 1.6 ng/mL  CARDIAC MARKERS ( 22 Apr 2024 18:50 )  x     / x     / 81 U/L / x     / <1.0 ng/mL      Urinalysis Basic - ( 22 Apr 2024 20:29 )    Color: x / Appearance: x / SG: x / pH: x  Gluc: 85 mg/dL / Ketone: x  / Bili: x / Urobili: x   Blood: x / Protein: x / Nitrite: x   Leuk Esterase: x / RBC: x / WBC x   Sq Epi: x / Non Sq Epi: x / Bacteria: x      Lactate, Blood: 2.3 mmol/L (04-22 @ 23:26)      04-22 @ 20:19  3.9  34      Thyroid Stimulating Hormone, Serum: 4.13 uIU/mL (04-22 @ 18:50)          Consultant(s) Notes Reviewed:      Care Discussed with Consultants/Other Providers:

## 2024-04-23 NOTE — CONSULT NOTE ADULT - ASSESSMENT
Pt is a 54yo M w/ PMH of asthma, chronic back pain pw intermittent chest discomfort and SOB.  Reports ~1 week's time of SOB and dizziness a/w intermittent chest discomfort for which he saw his PMD/cardiologist and underwent ECG notable for RBBB. Given his recent travel to [place] about a month ago he was advised to f/u w/ CTA PE protocol outpt to r/o PE given his symptoms which he has not gotten yet. On day of presentation he was at friend's home to and became very SOB and felt unwell when walking in and thus presented to [place] where he was noted to be in CHB and thus tx here to [facility].   He otherwise endorses having the flu about a month ago s/p tamiflu and a recent rash which he describes as blotches on his upper and lower extremities for which he saw a dermatologist and underwent 2 tapers of steroids which helped. He otherwise denies recent hiking though does report bike riding in the park, last in October of last year. Not on AV darcy blockade   pt with heart block ?etiology  check trop  agree with  lyme titer  awaiting echo  esr/ crp  tsh normal  repeat blood work this am   Pt is a 52yo M w/ PMH of asthma, chronic back pain pw intermittent chest discomfort and SOB.  Reports ~1 week's time of SOB and dizziness a/w intermittent chest discomfort for which he saw his PMD/cardiologist and underwent ECG notable for RBBB. Given his recent travel to [place] about a month ago he was advised to f/u w/ CTA PE protocol outpt to r/o PE given his symptoms which he has not gotten yet. On day of presentation he was at friend's home to and became very SOB and felt unwell when walking in and thus presented to [place] where he was noted to be in CHB and thus tx here to [facility].   He otherwise endorses having the flu about a month ago s/p tamiflu and a recent rash which he describes as blotches on his upper and lower extremities for which he saw a dermatologist and underwent 2 tapers of steroids which helped. He otherwise denies recent hiking though does report bike riding in the park, last in October of last year. Not on AV darcy blockade   pt with heart block ?etiology  check trop  agree with  lyme titer  awaiting echo, noted  esr/ crp  tsh normal  repeat blood work this am

## 2024-04-23 NOTE — CONSULT NOTE ADULT - SUBJECTIVE AND OBJECTIVE BOX
Date of Service, 04-23-24 @ 07:13  CHIEF COMPLAINT:Patient is a 53y old  Male who presents with a chief complaint of CHB (23 Apr 2024 06:39)      HPI:  Pt is a 52yo M w/ PMH of asthma, chronic back pain pw intermittent chest discomfort and SOB.  Reports ~1 week's time of SOB and dizziness a/w intermittent chest discomfort for which he saw his PMD/cardiologist and underwent ECG notable for RBBB. Given his recent travel to [place] about a month ago he was advised to f/u w/ CTA PE protocol outpt to r/o PE given his symptoms which he has not gotten yet. On day of presentation he was at friend's home to and became very SOB and felt unwell when walking in and thus presented to [place] where he was noted to be in [facility] and thus tx here to [facility].   He otherwise endorses having the flu about a month ago s/p tamiflu and a recent rash which he describes as blotches on his upper and lower extremities for which he saw a dermatologist and underwent 2 tapers of steroids which helped. He otherwise denies recent hiking though does report bike riding in the park, last in October of last year. Not on AV darcy blockade       PAST MEDICAL & SURGICAL HISTORY:  Asthma  Chronic back pain  S/P appendectomy    MEDICATIONS  (STANDING):  budesonide 160 MICROgram(s)/formoterol 4.5 MICROgram(s) Inhaler 2 Puff(s) Inhalation two times a day  cefTRIAXone   IVPB 2000 milliGRAM(s) IV Intermittent every 24 hours  dextrose 5% + sodium chloride 0.9%. 1000 milliLiter(s) (60 mL/Hr) IV Continuous <Continuous>  gabapentin 600 milliGRAM(s) Oral three times a day  mirabegron ER 25 milliGRAM(s) Oral daily  montelukast 10 milliGRAM(s) Oral daily    MEDICATIONS  (PRN):  acetaminophen     Tablet .. 650 milliGRAM(s) Oral every 6 hours PRN Temp greater or equal to 38C (100.4F), Mild Pain (1 - 3)      FAMILY HISTORY:  FH: HTN (hypertension) (Father, Mother)        SOCIAL HISTORY:    [x ] Non-smoker  [ ] Smoker  [ ] Alcohol    Allergies    No Known Allergies    Intolerances    	    REVIEW OF SYSTEMS:  CONSTITUTIONAL: No fever, weight loss, or fatigue  EYES: No eye pain, visual disturbances, or discharge  ENT:  No difficulty hearing, tinnitus, vertigo; No sinus or throat pain  NECK: No pain or stiffness  RESPIRATORY: No cough, wheezing, chills or hemoptysis; No Shortness of Breath  CARDIOVASCULAR: + chest pain, no palpitations, passing out, dizziness, or leg swelling  GASTROINTESTINAL: No abdominal or epigastric pain. No nausea, vomiting, or hematemesis; No diarrhea or constipation. No melena or hematochezia.  GENITOURINARY: No dysuria, frequency, hematuria, or incontinence  NEUROLOGICAL: No headaches, memory loss, loss of strength, numbness, or tremors  SKIN: No itching, burning, rashes, or lesions   LYMPH Nodes: No enlarged glands  ENDOCRINE: No heat or cold intolerance; No hair loss  MUSCULOSKELETAL: No joint pain or swelling; No muscle, back, or extremity pain  PSYCHIATRIC: No depression, anxiety, mood swings, or difficulty sleeping  HEME/LYMPH: No easy bruising, or bleeding gums  ALLERGY AND IMMUNOLOGIC: No hives or eczema	    [ ] All others negative	  [ ] Unable to obtain    PHYSICAL EXAM:  T(C): 36.9 (04-23-24 @ 04:25), Max: 37.1 (04-22-24 @ 21:08)  HR: 39 (04-23-24 @ 04:25) (39 - 73)  BP: 103/62 (04-23-24 @ 04:25) (103/62 - 143/63)  RR: 18 (04-23-24 @ 04:25) (15 - 18)  SpO2: 99% (04-23-24 @ 04:25) (95% - 99%)  Wt(kg): --  I&O's Summary    22 Apr 2024 07:01  -  23 Apr 2024 07:00  --------------------------------------------------------  IN: 0 mL / OUT: 900 mL / NET: -900 mL        Appearance: Normal	  HEENT:   Normal oral mucosa, PERRL, EOMI	  Lymphatic: No lymphadenopathy  Cardiovascular: Normal S1 S2, No JVD,+murmurs, No edema  Respiratory: rhonchi  Psychiatry: A & O x 3, Mood & affect appropriate  Gastrointestinal:  Soft, Non-tender, + BS	  Skin: No rashes, No ecchymoses, No cyanosis	  Neurologic: Non-focal  Extremities: Normal range of motion, No clubbing, cyanosis or edema  Vascular: Peripheral pulses palpable 2+ bilaterally    TELEMETRY: 	    ECG:  	  RADIOLOGY:  OTHER: 	  	  LABS:	 	    CARDIAC MARKERS:  CARDIAC MARKERS ( 22 Apr 2024 20:29 )  x     / x     / 95 U/L / x     / 1.6 ng/mL  CARDIAC MARKERS ( 22 Apr 2024 18:50 )  x     / x     / 81 U/L / x     / <1.0 ng/mL                              14.8   15.33 )-----------( 174      ( 22 Apr 2024 20:29 )             43.7     04-22    143  |  110<H>  |  11  ----------------------------<  85  4.3   |  21<L>  |  0.92    Ca    9.3      22 Apr 2024 20:29  Mg     2.2     04-22    TPro  6.3  /  Alb  4.0  /  TBili  0.7  /  DBili  x   /  AST  36  /  ALT  96<H>  /  AlkPhos  84  04-22    proBNP:   Lipid Profile:   HgA1c:   TSH: Thyroid Stimulating Hormone, Serum: 4.13 uIU/mL (04-22 @ 18:50)    PT/INR - ( 22 Apr 2024 18:50 )   PT: 11.3 sec;   INR: 0.96 ratio         PTT - ( 22 Apr 2024 18:50 )  PTT:27.1 sec    PREVIOUS DIAGNOSTIC TESTING:      < from: Xray Chest 1 View- PORTABLE-Urgent (04.22.24 @ 21:07) >  FINDINGS:  Multiple radiodense objects overlie the neck soft tissues.  The lungs are clear.  There is no pleural effusion or pneumothorax.  The heart size is normal.  No acute bony abnormality.    IMPRESSION:  Clear lungs.           Date of Service, 04-23-24 @ 07:13  CHIEF COMPLAINT:Patient is a 53y old  Male who presents with a chief complaint of CHB (23 Apr 2024 06:39)      HPI:  Pt is a 52yo M w/ PMH of asthma, chronic back pain pw intermittent chest discomfort and SOB.  Reports ~1 week's time of SOB and dizziness a/w intermittent chest discomfort for which he saw his PMD/cardiologist and underwent ECG notable for RBBB. Given his recent travel to [place] about a month ago he was advised to f/u w/ CTA PE protocol outpt to r/o PE given his symptoms which he has not gotten yet. On day of presentation he was at friend's home to and became very SOB and felt unwell when walking in and thus presented to [place] where he was noted to be in [facility] and thus tx here to [facility].   He otherwise endorses having the flu about a month ago s/p tamiflu and a recent rash which he describes as blotches on his upper and lower extremities for which he saw a dermatologist and underwent 2 tapers of steroids which helped. He otherwise denies recent hiking though does report bike riding in the park, last in October of last year. Not on AV darcy blockade       PAST MEDICAL & SURGICAL HISTORY:  Asthma  Chronic back pain  S/P appendectomy    MEDICATIONS  (STANDING):  budesonide 160 MICROgram(s)/formoterol 4.5 MICROgram(s) Inhaler 2 Puff(s) Inhalation two times a day  cefTRIAXone   IVPB 2000 milliGRAM(s) IV Intermittent every 24 hours  dextrose 5% + sodium chloride 0.9%. 1000 milliLiter(s) (60 mL/Hr) IV Continuous <Continuous>  gabapentin 600 milliGRAM(s) Oral three times a day  mirabegron ER 25 milliGRAM(s) Oral daily  montelukast 10 milliGRAM(s) Oral daily    MEDICATIONS  (PRN):  acetaminophen     Tablet .. 650 milliGRAM(s) Oral every 6 hours PRN Temp greater or equal to 38C (100.4F), Mild Pain (1 - 3)      FAMILY HISTORY:  FH: HTN (hypertension) (Father, Mother)        SOCIAL HISTORY:    [x ] Non-smoker  [ ] Smoker  [ ] Alcohol    Allergies    No Known Allergies    Intolerances    	    REVIEW OF SYSTEMS:  CONSTITUTIONAL: No fever, weight loss, or fatigue  EYES: No eye pain, visual disturbances, or discharge  ENT:  No difficulty hearing, tinnitus, vertigo; No sinus or throat pain  NECK: No pain or stiffness  RESPIRATORY: No cough, wheezing, chills or hemoptysis; No Shortness of Breath  CARDIOVASCULAR: + chest pain, no palpitations, passing out, dizziness, or leg swelling  GASTROINTESTINAL: No abdominal or epigastric pain. No nausea, vomiting, or hematemesis; No diarrhea or constipation. No melena or hematochezia.  GENITOURINARY: No dysuria, frequency, hematuria, or incontinence  NEUROLOGICAL: No headaches, memory loss, loss of strength, numbness, or tremors  SKIN: No itching, burning, rashes, or lesions   LYMPH Nodes: No enlarged glands  ENDOCRINE: No heat or cold intolerance; No hair loss  MUSCULOSKELETAL: No joint pain or swelling; No muscle, back, or extremity pain  PSYCHIATRIC: No depression, anxiety, mood swings, or difficulty sleeping  HEME/LYMPH: No easy bruising, or bleeding gums  ALLERGY AND IMMUNOLOGIC: No hives or eczema	    [ x] All others negative	  [ ] Unable to obtain    PHYSICAL EXAM:  T(C): 36.9 (04-23-24 @ 04:25), Max: 37.1 (04-22-24 @ 21:08)  HR: 39 (04-23-24 @ 04:25) (39 - 73)  BP: 103/62 (04-23-24 @ 04:25) (103/62 - 143/63)  RR: 18 (04-23-24 @ 04:25) (15 - 18)  SpO2: 99% (04-23-24 @ 04:25) (95% - 99%)  Wt(kg): --  I&O's Summary    22 Apr 2024 07:01  -  23 Apr 2024 07:00  --------------------------------------------------------  IN: 0 mL / OUT: 900 mL / NET: -900 mL        Appearance: Normal	  HEENT:   Normal oral mucosa, PERRL, EOMI	  Lymphatic: No lymphadenopathy  Cardiovascular: Normal S1 S2, No JVD no murmurs, No edema  Respiratory: rhonchi  Psychiatry: A & O x 3, Mood & affect appropriate  Gastrointestinal:  Soft, Non-tender, + BS	  Skin: No rashes, No ecchymoses, No cyanosis	  Neurologic: Non-focal  Extremities: Normal range of motion, No clubbing, cyanosis or edema  Vascular: Peripheral pulses palpable 2+ bilaterally    TELEMETRY: 	    ECG:  	  RADIOLOGY:  OTHER: 	  	  LABS:	 	    CARDIAC MARKERS:  CARDIAC MARKERS ( 22 Apr 2024 20:29 )  x     / x     / 95 U/L / x     / 1.6 ng/mL  CARDIAC MARKERS ( 22 Apr 2024 18:50 )  x     / x     / 81 U/L / x     / <1.0 ng/mL                              14.8   15.33 )-----------( 174      ( 22 Apr 2024 20:29 )             43.7     04-22    143  |  110<H>  |  11  ----------------------------<  85  4.3   |  21<L>  |  0.92    Ca    9.3      22 Apr 2024 20:29  Mg     2.2     04-22    TPro  6.3  /  Alb  4.0  /  TBili  0.7  /  DBili  x   /  AST  36  /  ALT  96<H>  /  AlkPhos  84  04-22    proBNP:   Lipid Profile:   HgA1c:   TSH: Thyroid Stimulating Hormone, Serum: 4.13 uIU/mL (04-22 @ 18:50)    PT/INR - ( 22 Apr 2024 18:50 )   PT: 11.3 sec;   INR: 0.96 ratio         PTT - ( 22 Apr 2024 18:50 )  PTT:27.1 sec    PREVIOUS DIAGNOSTIC TESTING:      < from: Xray Chest 1 View- PORTABLE-Urgent (04.22.24 @ 21:07) >  FINDINGS:  Multiple radiodense objects overlie the neck soft tissues.  The lungs are clear.  There is no pleural effusion or pneumothorax.  The heart size is normal.  No acute bony abnormality.    IMPRESSION:  Clear lungs.      < from: TTE W or WO Ultrasound Enhancing Agent (04.23.24 @ 15:26) >      1. Left ventricular cavity is normal in size. Left ventricular wallthickness is normal. Left ventricular systolic function is normal with an ejection fraction of 71 % by Reed's method of disks. There are no regional wall motion abnormalities seen.   2. Normal left ventricular diastolic function, with normal filling pressure.   3. Normal right ventricular cavity size, with normal wall thickness, and normal systolic function.   4. Normal left and right atrial size.   5. No significant valvular disease.   6. No pericardial effusion seen.   7. No prior echocardiogram is available for comparison.        < from: CT Chest No Cont (04.23.24 @ 14:29) >  Lungs/Airways/Pleura: Mild dependent bibasilar atelectasis. The central   airways are patent. Trace pleural effusions. The lungs are otherwise   clear.    Mediastinum/Lymph nodes: No thoracic adenopathy.    Heart and Vessels: The great vessels are normal in size. The heart is   normal in size. No pericardial effusion.    Upper Abdomen: Unremarkable.    Osseous structures and Soft Tissues: No aggressive bone lesions. Numerous   metallic BBs are noted in the anterior and lateral soft tissues of the   neck.    IMPRESSION:  No thoracic lymphadenopathy.

## 2024-04-23 NOTE — PROGRESS NOTE ADULT - ASSESSMENT
53M     w/ PMH of asthma         presenting with CHB w/ RBBB escape     hr  in 30's     telemetry      elevated  wbc,. on iv   ceftriaxone      Lyme titers, T3, T4       echo     -low threshold to transfer to CCU for TVP if patient   decompensates 53M     w/ PMH of asthma         presenting with CHB w/ RBBB escape     hr  in 30's     telemetry      elevated  wbc,. on iv   ceftriaxone      Lyme titers, T3, T4       echo     -low threshold to transfer to CCU for TVP if patient   decompensates    wbc  noted. pt  was on oral  steroids /  for  dermatitis , that   comes  and  goes. its    c/c /  ha s  old  excoriations  on legs    no  h/o  tick bite    ,and  also  s/p  epidural  injection, about 2  weeks ago per .  per  pt    pt's   significant  other  at bedside,  also   contributing to  the history

## 2024-04-24 LAB
ADD ON TEST-SPECIMEN IN LAB: SIGNIFICANT CHANGE UP
ANION GAP SERPL CALC-SCNC: 12 MMOL/L — SIGNIFICANT CHANGE UP (ref 5–17)
BUN SERPL-MCNC: 12 MG/DL — SIGNIFICANT CHANGE UP (ref 7–23)
CALCIUM SERPL-MCNC: 9.1 MG/DL — SIGNIFICANT CHANGE UP (ref 8.4–10.5)
CHLORIDE SERPL-SCNC: 110 MMOL/L — HIGH (ref 96–108)
CO2 SERPL-SCNC: 20 MMOL/L — LOW (ref 22–31)
CREAT SERPL-MCNC: 0.94 MG/DL — SIGNIFICANT CHANGE UP (ref 0.5–1.3)
CRP SERPL-MCNC: 15 MG/L — HIGH (ref 0–4)
EGFR: 96 ML/MIN/1.73M2 — SIGNIFICANT CHANGE UP
ERYTHROCYTE [SEDIMENTATION RATE] IN BLOOD: 5 MM/HR — SIGNIFICANT CHANGE UP (ref 0–20)
GLUCOSE SERPL-MCNC: 98 MG/DL — SIGNIFICANT CHANGE UP (ref 70–99)
HCT VFR BLD CALC: 42.8 % — SIGNIFICANT CHANGE UP (ref 39–50)
HGB BLD-MCNC: 14.4 G/DL — SIGNIFICANT CHANGE UP (ref 13–17)
MCHC RBC-ENTMCNC: 30.2 PG — SIGNIFICANT CHANGE UP (ref 27–34)
MCHC RBC-ENTMCNC: 33.6 GM/DL — SIGNIFICANT CHANGE UP (ref 32–36)
MCV RBC AUTO: 89.7 FL — SIGNIFICANT CHANGE UP (ref 80–100)
NRBC # BLD: 0 /100 WBCS — SIGNIFICANT CHANGE UP (ref 0–0)
PLATELET # BLD AUTO: 156 K/UL — SIGNIFICANT CHANGE UP (ref 150–400)
POTASSIUM SERPL-MCNC: 4.2 MMOL/L — SIGNIFICANT CHANGE UP (ref 3.5–5.3)
POTASSIUM SERPL-SCNC: 4.2 MMOL/L — SIGNIFICANT CHANGE UP (ref 3.5–5.3)
RBC # BLD: 4.77 M/UL — SIGNIFICANT CHANGE UP (ref 4.2–5.8)
RBC # FLD: 15.6 % — HIGH (ref 10.3–14.5)
SODIUM SERPL-SCNC: 142 MMOL/L — SIGNIFICANT CHANGE UP (ref 135–145)
WBC # BLD: 12.08 K/UL — HIGH (ref 3.8–10.5)
WBC # FLD AUTO: 12.08 K/UL — HIGH (ref 3.8–10.5)

## 2024-04-24 PROCEDURE — 99231 SBSQ HOSP IP/OBS SF/LOW 25: CPT | Mod: GC

## 2024-04-24 RX ADMIN — GABAPENTIN 600 MILLIGRAM(S): 400 CAPSULE ORAL at 14:45

## 2024-04-24 RX ADMIN — MONTELUKAST 10 MILLIGRAM(S): 4 TABLET, CHEWABLE ORAL at 14:45

## 2024-04-24 RX ADMIN — BUDESONIDE AND FORMOTEROL FUMARATE DIHYDRATE 2 PUFF(S): 160; 4.5 AEROSOL RESPIRATORY (INHALATION) at 05:06

## 2024-04-24 RX ADMIN — CALAMINE AND ZINC OXIDE AND PHENOL 1 APPLICATION(S): 160; 10 LOTION TOPICAL at 05:06

## 2024-04-24 RX ADMIN — BUDESONIDE AND FORMOTEROL FUMARATE DIHYDRATE 2 PUFF(S): 160; 4.5 AEROSOL RESPIRATORY (INHALATION) at 18:06

## 2024-04-24 RX ADMIN — CALAMINE AND ZINC OXIDE AND PHENOL 1 APPLICATION(S): 160; 10 LOTION TOPICAL at 21:00

## 2024-04-24 RX ADMIN — MIRABEGRON 25 MILLIGRAM(S): 50 TABLET, EXTENDED RELEASE ORAL at 21:00

## 2024-04-24 RX ADMIN — GABAPENTIN 600 MILLIGRAM(S): 400 CAPSULE ORAL at 05:07

## 2024-04-24 RX ADMIN — GABAPENTIN 600 MILLIGRAM(S): 400 CAPSULE ORAL at 21:00

## 2024-04-24 RX ADMIN — CEFTRIAXONE 100 MILLIGRAM(S): 500 INJECTION, POWDER, FOR SOLUTION INTRAMUSCULAR; INTRAVENOUS at 21:00

## 2024-04-24 NOTE — PROGRESS NOTE ADULT - ASSESSMENT
53M     w/ PMH of asthma         presenting with CHB w/ RBBB escape     hr  in 30's     telemetry      elevated  wbc,. on iv   ceftriaxone     wbc  noted. pt  was on oral  steroids /  for  dermatitis , that   comes  and  goes. its    c/c /  ha s  old  excoriations  on legs    no  h/o  tick bite    ,and  also  s/p  epidural  injection, about 2  weeks ago per .  per  pt    wbc at  12,000    echo,  normal  ef/ Ct  c/  normal    tsh  noted,  no  rx  needed    lyme  , pending    hr  in 30's  53M     w/ PMH of asthma         presenting with CHB w/ RBBB escape     hr  in 30's     telemetry      elevated  wbc,. on iv   ceftriaxone     wbc  noted. pt  was on oral  steroids /  for  dermatitis , that   comes  and  goes. its    c/c /  ha s  old  excoriations  on legs    no  h/o  tick bite    ,and  also  s/p  epidural  injection, about 2  weeks ago per .  per  pt    wbc at  12,000    echo,  normal  ef/ Ct  c/  normal    tsh  noted,  no  rx  needed    lyme  titer , pending/ crp  elevated,  no  cause  found    hr  in 30's .  awaiting  ppm

## 2024-04-24 NOTE — PROGRESS NOTE ADULT - SUBJECTIVE AND OBJECTIVE BOX
Date of Service: 04-24-24 @ 07:37           CARDIOLOGY     PROGRESS  NOTE   ________________________________________________    CHIEF COMPLAINT:Patient is a 53y old  Male who presents with a chief complaint of CHB (23 Apr 2024 07:12)  comfoirtable  	  REVIEW OF SYSTEMS:  CONSTITUTIONAL: No fever, weight loss, or fatigue  EYES: No eye pain, visual disturbances, or discharge  ENT:  No difficulty hearing, tinnitus, vertigo; No sinus or throat pain  NECK: No pain or stiffness  RESPIRATORY: No cough, wheezing, chills or hemoptysis; No Shortness of Breath  CARDIOVASCULAR: No chest pain, palpitations, passing out, dizziness, or leg swelling  GASTROINTESTINAL: No abdominal or epigastric pain. No nausea, vomiting, or hematemesis; No diarrhea or constipation. No melena or hematochezia.  GENITOURINARY: No dysuria, frequency, hematuria, or incontinence  NEUROLOGICAL: No headaches, memory loss, loss of strength, numbness, or tremors  SKIN: No itching, burning, rashes, or lesions   LYMPH Nodes: No enlarged glands  ENDOCRINE: No heat or cold intolerance; No hair loss  MUSCULOSKELETAL: No joint pain or swelling; No muscle, back, or extremity pain  PSYCHIATRIC: No depression, anxiety, mood swings, or difficulty sleeping  HEME/LYMPH: No easy bruising, or bleeding gums  ALLERGY AND IMMUNOLOGIC: No hives or eczema	    [x ] All others negative	  [ ] Unable to obtain    PHYSICAL EXAM:  T(C): 36.9 (04-24-24 @ 04:28), Max: 37.4 (04-23-24 @ 16:06)  HR: 35 (04-24-24 @ 06:52) (35 - 41)  BP: 110/68 (04-24-24 @ 04:28) (110/68 - 123/72)  RR: 18 (04-24-24 @ 04:28) (18 - 18)  SpO2: 94% (04-24-24 @ 04:28) (93% - 97%)  Wt(kg): --  I&O's Summary    23 Apr 2024 07:01  -  24 Apr 2024 07:00  --------------------------------------------------------  IN: 775 mL / OUT: 1140 mL / NET: -365 mL        Appearance: Normal	  HEENT:   Normal oral mucosa, PERRL, EOMI	  Lymphatic: No lymphadenopathy  Cardiovascular: Normal S1 S2, No JVD, + murmurs, No edema  Respiratory: Lungs clear to auscultation	  Psychiatry: A & O x 3, Mood & affect appropriate  Gastrointestinal:  Soft, Non-tender, + BS	  Skin: No rashes, No ecchymoses, No cyanosis	  Neurologic: Non-focal  Extremities: Normal range of motion, No clubbing, cyanosis or edema  Vascular: Peripheral pulses palpable 2+ bilaterally    MEDICATIONS  (STANDING):  budesonide 160 MICROgram(s)/formoterol 4.5 MICROgram(s) Inhaler 2 Puff(s) Inhalation two times a day  calamine/zinc oxide Lotion 1 Application(s) Topical two times a day  cefTRIAXone   IVPB 2000 milliGRAM(s) IV Intermittent every 24 hours  dextrose 5% + sodium chloride 0.9%. 1000 milliLiter(s) (60 mL/Hr) IV Continuous <Continuous>  gabapentin 600 milliGRAM(s) Oral three times a day  mirabegron ER 25 milliGRAM(s) Oral daily  montelukast 10 milliGRAM(s) Oral daily      TELEMETRY: 	    ECG:  	  RADIOLOGY:  OTHER: 	  	  LABS:	 	    CARDIAC MARKERS:  CARDIAC MARKERS ( 22 Apr 2024 20:29 )  x     / x     / 95 U/L / x     / 1.6 ng/mL  CARDIAC MARKERS ( 22 Apr 2024 18:50 )  x     / x     / 81 U/L / x     / <1.0 ng/mL                                14.4   12.08 )-----------( 156      ( 24 Apr 2024 07:10 )             42.8     04-23    142  |  111<H>  |  10  ----------------------------<  89  4.0   |  18<L>  |  0.90    Ca    8.9      23 Apr 2024 07:18  Phos  2.7     04-23  Mg     2.1     04-23    TPro  5.6<L>  /  Alb  3.6  /  TBili  0.4  /  DBili  x   /  AST  23  /  ALT  79<H>  /  AlkPhos  73  04-23    proBNP:   Lipid Profile:   HgA1c:   TSH: Thyroid Stimulating Hormone, Serum: 4.39 uIU/mL (04-23 @ 09:08)  Thyroid Stimulating Hormone, Serum: 4.13 uIU/mL (04-22 @ 18:50)    PT/INR - ( 22 Apr 2024 18:50 )   PT: 11.3 sec;   INR: 0.96 ratio         PTT - ( 22 Apr 2024 18:50 )  PTT:27.1 sec    Borrelia burgdorferi IgG/IgM Antibodies (04.22.24 @ 23:26)    LYME IgG/IgM Antibodies Result: 0.07 Index   Lyme C6 Interpretation: Negative: A negative result may occur in patients recently (< 14 days) infected  with Borrelia burgdorferi. If early Lyme disease is suspected, consider  collecting a new sample 2 - 4 weeks later for repeat testing.  Reference Range: (expressed as Index values)  < 0.90             Negative  0.90 - 1.09      Equivocal  >= 1.10           Positive  CDC/ASTPHLD Guidelines recommend that all samples judged equivocal or  positive be re-tested by confirmatory immunoassays.  Method: Chemiluminescent Immunoassay    < from: TTE W or WO Ultrasound Enhancing Agent (04.23.24 @ 15:26) >   1. Left ventricular cavity is normal in size. Left ventricular wallthickness is normal. Left ventricular systolic function is normal with an ejection fraction of 71 % by Reed's method of disks. There are no regional wall motion abnormalities seen.   2. Normal left ventricular diastolic function, with normal filling pressure.   3. Normal right ventricular cavity size, with normal wall thickness, and normal systolic function.   4. Normal left and right atrial size.   5. No significant valvular disease.   6. No pericardial effusion seen.   7. No prior echocardiogram is available for comparison.    < from: CT Chest No Cont (04.23.24 @ 14:29) >  Lungs/Airways/Pleura: Mild dependent bibasilar atelectasis. The central   airways are patent. Trace pleural effusions. The lungs are otherwise   clear.    Mediastinum/Lymph nodes: No thoracic adenopathy.    Heart and Vessels: The great vessels are normal in size. The heart is   normal in size. No pericardial effusion.    Upper Abdomen: Unremarkable.    Osseous structures and Soft Tissues: No aggressive bone lesions. Numerous   metallic BBs are noted in the anterior and lateral soft tissues of the   neck.    IMPRESSION:  No thoracic lymphadenopathy.      Assessment and plan  ---------------------------  Pt is a 54yo M w/ PMH of asthma, chronic back pain pw intermittent chest discomfort and SOB.  Reports ~1 week's time of SOB and dizziness a/w intermittent chest discomfort for which he saw his PMD/cardiologist and underwent ECG notable for RBBB. Given his recent travel to [place] about a month ago he was advised to f/u w/ CTA PE protocol outpt to r/o PE given his symptoms which he has not gotten yet. On day of presentation he was at friend's home to and became very SOB and felt unwell when walking in and thus presented to [place] where he was noted to be in CHB and thus tx here to [facility].   He otherwise endorses having the flu about a month ago s/p tamiflu and a recent rash which he describes as blotches on his upper and lower extremities for which he saw a dermatologist and underwent 2 tapers of steroids which helped. He otherwise denies recent hiking though does report bike riding in the park, last in October of last year. Not on AV darcy blockade   pt with heart block ?etiology  check trop  agree with  lyme titer  awaiting echo, noted  esr/ crp  tsh normal  ct , no lymphoadenopathy, echo no regional wall motion abnormalitry, lyme titer negative  PPM with Dr Frost today    	         Date of Service: 04-24-24 @ 07:37           CARDIOLOGY     PROGRESS  NOTE   ________________________________________________    CHIEF COMPLAINT:Patient is a 53y old  Male who presents with a chief complaint of CHB (23 Apr 2024 07:12)  comfoirtable  	  REVIEW OF SYSTEMS:  CONSTITUTIONAL: No fever, weight loss, or fatigue  EYES: No eye pain, visual disturbances, or discharge  ENT:  No difficulty hearing, tinnitus, vertigo; No sinus or throat pain  NECK: No pain or stiffness  RESPIRATORY: No cough, wheezing, chills or hemoptysis; No Shortness of Breath  CARDIOVASCULAR: No chest pain, palpitations, passing out, dizziness, or leg swelling  GASTROINTESTINAL: No abdominal or epigastric pain. No nausea, vomiting, or hematemesis; No diarrhea or constipation. No melena or hematochezia.  GENITOURINARY: No dysuria, frequency, hematuria, or incontinence  NEUROLOGICAL: No headaches, memory loss, loss of strength, numbness, or tremors  SKIN: No itching, burning, rashes, or lesions   LYMPH Nodes: No enlarged glands  ENDOCRINE: No heat or cold intolerance; No hair loss  MUSCULOSKELETAL: No joint pain or swelling; No muscle, back, or extremity pain  PSYCHIATRIC: No depression, anxiety, mood swings, or difficulty sleeping  HEME/LYMPH: No easy bruising, or bleeding gums  ALLERGY AND IMMUNOLOGIC: No hives or eczema	    [x ] All others negative	  [ ] Unable to obtain    PHYSICAL EXAM:  T(C): 36.9 (04-24-24 @ 04:28), Max: 37.4 (04-23-24 @ 16:06)  HR: 35 (04-24-24 @ 06:52) (35 - 41)  BP: 110/68 (04-24-24 @ 04:28) (110/68 - 123/72)  RR: 18 (04-24-24 @ 04:28) (18 - 18)  SpO2: 94% (04-24-24 @ 04:28) (93% - 97%)  Wt(kg): --  I&O's Summary    23 Apr 2024 07:01  -  24 Apr 2024 07:00  --------------------------------------------------------  IN: 775 mL / OUT: 1140 mL / NET: -365 mL        Appearance: Normal	  HEENT:   Normal oral mucosa, PERRL, EOMI	  Lymphatic: No lymphadenopathy  Cardiovascular: Normal S1 S2, No JVD, + murmurs, No edema  Respiratory: Lungs clear to auscultation	  Psychiatry: A & O x 3, Mood & affect appropriate  Gastrointestinal:  Soft, Non-tender, + BS	  Skin: No rashes, No ecchymoses, No cyanosis	  Neurologic: Non-focal  Extremities: Normal range of motion, No clubbing, cyanosis or edema  Vascular: Peripheral pulses palpable 2+ bilaterally    MEDICATIONS  (STANDING):  budesonide 160 MICROgram(s)/formoterol 4.5 MICROgram(s) Inhaler 2 Puff(s) Inhalation two times a day  calamine/zinc oxide Lotion 1 Application(s) Topical two times a day  cefTRIAXone   IVPB 2000 milliGRAM(s) IV Intermittent every 24 hours  dextrose 5% + sodium chloride 0.9%. 1000 milliLiter(s) (60 mL/Hr) IV Continuous <Continuous>  gabapentin 600 milliGRAM(s) Oral three times a day  mirabegron ER 25 milliGRAM(s) Oral daily  montelukast 10 milliGRAM(s) Oral daily      TELEMETRY: 	    ECG:  	  RADIOLOGY:  OTHER: 	  	  LABS:	 	    CARDIAC MARKERS:  CARDIAC MARKERS ( 22 Apr 2024 20:29 )  x     / x     / 95 U/L / x     / 1.6 ng/mL  CARDIAC MARKERS ( 22 Apr 2024 18:50 )  x     / x     / 81 U/L / x     / <1.0 ng/mL                                14.4   12.08 )-----------( 156      ( 24 Apr 2024 07:10 )             42.8     04-23    142  |  111<H>  |  10  ----------------------------<  89  4.0   |  18<L>  |  0.90    Ca    8.9      23 Apr 2024 07:18  Phos  2.7     04-23  Mg     2.1     04-23    TPro  5.6<L>  /  Alb  3.6  /  TBili  0.4  /  DBili  x   /  AST  23  /  ALT  79<H>  /  AlkPhos  73  04-23    proBNP:   Lipid Profile:   HgA1c:   TSH: Thyroid Stimulating Hormone, Serum: 4.39 uIU/mL (04-23 @ 09:08)  Thyroid Stimulating Hormone, Serum: 4.13 uIU/mL (04-22 @ 18:50)    PT/INR - ( 22 Apr 2024 18:50 )   PT: 11.3 sec;   INR: 0.96 ratio         PTT - ( 22 Apr 2024 18:50 )  PTT:27.1 sec    Borrelia burgdorferi IgG/IgM Antibodies (04.22.24 @ 23:26)    LYME IgG/IgM Antibodies Result: 0.07 Index   Lyme C6 Interpretation: Negative: A negative result may occur in patients recently (< 14 days) infected  with Borrelia burgdorferi. If early Lyme disease is suspected, consider  collecting a new sample 2 - 4 weeks later for repeat testing.  Reference Range: (expressed as Index values)  < 0.90             Negative  0.90 - 1.09      Equivocal  >= 1.10           Positive  CDC/ASTPHLD Guidelines recommend that all samples judged equivocal or  positive be re-tested by confirmatory immunoassays.  Method: Chemiluminescent Immunoassay    < from: TTE W or WO Ultrasound Enhancing Agent (04.23.24 @ 15:26) >   1. Left ventricular cavity is normal in size. Left ventricular wallthickness is normal. Left ventricular systolic function is normal with an ejection fraction of 71 % by Reed's method of disks. There are no regional wall motion abnormalities seen.   2. Normal left ventricular diastolic function, with normal filling pressure.   3. Normal right ventricular cavity size, with normal wall thickness, and normal systolic function.   4. Normal left and right atrial size.   5. No significant valvular disease.   6. No pericardial effusion seen.   7. No prior echocardiogram is available for comparison.    < from: CT Chest No Cont (04.23.24 @ 14:29) >  Lungs/Airways/Pleura: Mild dependent bibasilar atelectasis. The central   airways are patent. Trace pleural effusions. The lungs are otherwise   clear.    Mediastinum/Lymph nodes: No thoracic adenopathy.    Heart and Vessels: The great vessels are normal in size. The heart is   normal in size. No pericardial effusion.    Upper Abdomen: Unremarkable.    Osseous structures and Soft Tissues: No aggressive bone lesions. Numerous   metallic BBs are noted in the anterior and lateral soft tissues of the   neck.    IMPRESSION:  No thoracic lymphadenopathy.      Assessment and plan  ---------------------------  Pt is a 54yo M w/ PMH of asthma, chronic back pain pw intermittent chest discomfort and SOB.  Reports ~1 week's time of SOB and dizziness a/w intermittent chest discomfort for which he saw his PMD/cardiologist and underwent ECG notable for RBBB. Given his recent travel to [place] about a month ago he was advised to f/u w/ CTA PE protocol outpt to r/o PE given his symptoms which he has not gotten yet. On day of presentation he was at friend's home to and became very SOB and felt unwell when walking in and thus presented to [place] where he was noted to be in CHB and thus tx here to [facility].   He otherwise endorses having the flu about a month ago s/p tamiflu and a recent rash which he describes as blotches on his upper and lower extremities for which he saw a dermatologist and underwent 2 tapers of steroids which helped. He otherwise denies recent hiking though does report bike riding in the park, last in October of last year. Not on AV darcy blockade   pt with heart block ?etiology  check trop  agree with  lyme titer  awaiting echo, noted  esr/ crp  tsh normal  ct , no lymphoadenopathy, echo no regional wall motion abnormality lyme titer negative ?pet scan if can be done as inpatient  doubt reversible cause

## 2024-04-24 NOTE — CHART NOTE - NSCHARTNOTEFT_GEN_A_CORE
Pt recommended for urgent PET-CT of chest/heart. Called radiology office [phone], spoke to Naima. Script faxed over to [phone]. Office to call [address] [place] [address] tomorrow to confirm appt time

## 2024-04-24 NOTE — PROGRESS NOTE ADULT - SUBJECTIVE AND OBJECTIVE BOX
date of service: 04-24-24 @ 08:10  afberkings,  hr  in 30's  REVIEW OF SYSTEMS:  CONSTITUTIONAL: No fever,  no  weight loss  ENT:  No  tinnitus,   no   vertigo  NECK: No pain or stiffness  RESPIRATORY: No cough, wheezing, chills or hemoptysis;    No Shortness of Breath  CARDIOVASCULAR: No chest pain, palpitations, dizziness  GASTROINTESTINAL: No abdominal or epigastric pain. No nausea, vomiting, or hematemesis; No diarrhea  No melena or hematochezia.  GENITOURINARY: No dysuria, frequency, hematuria, or incontinence  NEUROLOGICAL: No headaches  SKIN: No itching,  no   rash  LYMPH Nodes: No enlarged glands  ENDOCRINE: No heat or cold intolerance  MUSCULOSKELETAL: No joint pain or swelling  PSYCHIATRIC: No depression, anxiety  HEME/LYMPH: No easy bruising, or bleeding gums  ALLERGY AND IMMUNOLOGIC: No hives or eczema	    MEDICATIONS  (STANDING):  budesonide 160 MICROgram(s)/formoterol 4.5 MICROgram(s) Inhaler 2 Puff(s) Inhalation two times a day  calamine/zinc oxide Lotion 1 Application(s) Topical two times a day  cefTRIAXone   IVPB 2000 milliGRAM(s) IV Intermittent every 24 hours  dextrose 5% + sodium chloride 0.9%. 1000 milliLiter(s) (60 mL/Hr) IV Continuous <Continuous>  gabapentin 600 milliGRAM(s) Oral three times a day  mirabegron ER 25 milliGRAM(s) Oral daily  montelukast 10 milliGRAM(s) Oral daily    MEDICATIONS  (PRN):  acetaminophen     Tablet .. 650 milliGRAM(s) Oral every 6 hours PRN Temp greater or equal to 38C (100.4F), Mild Pain (1 - 3)      Vital Signs Last 24 Hrs  T(C): 36.9 (24 Apr 2024 04:28), Max: 37.4 (23 Apr 2024 16:06)  T(F): 98.5 (24 Apr 2024 04:28), Max: 99.3 (23 Apr 2024 16:06)  HR: 35 (24 Apr 2024 06:52) (35 - 41)  BP: 110/68 (24 Apr 2024 04:28) (110/68 - 123/72)  BP(mean): 83 (23 Apr 2024 11:34) (83 - 83)  RR: 18 (24 Apr 2024 04:28) (18 - 18)  SpO2: 94% (24 Apr 2024 04:28) (93% - 97%)    Parameters below as of 24 Apr 2024 04:28  Patient On (Oxygen Delivery Method): room air      CAPILLARY BLOOD GLUCOSE        I&O's Summary    23 Apr 2024 07:01  -  24 Apr 2024 07:00  --------------------------------------------------------  IN: 775 mL / OUT: 1140 mL / NET: -365 mL          Appearance: Normal	  HEENT:   Normal oral mucosa, PERRL, EOMI	  Lymphatic: No lymphadenopathy  Cardiovascular: Normal S1 S2, No JVD  Respiratory: Lungs clear to auscultation	  Gastrointestinal:  Soft, Non-tender, + BS	  Skin: No rash, No ecchymoses	  Extremities:     LABS:                        14.4   12.08 )-----------( 156      ( 24 Apr 2024 07:10 )             42.8     04-23    142  |  111<H>  |  10  ----------------------------<  89  4.0   |  18<L>  |  0.90    Ca    8.9      23 Apr 2024 07:18  Phos  2.7     04-23  Mg     2.1     04-23    TPro  5.6<L>  /  Alb  3.6  /  TBili  0.4  /  DBili  x   /  AST  23  /  ALT  79<H>  /  AlkPhos  73  04-23    PT/INR - ( 22 Apr 2024 18:50 )   PT: 11.3 sec;   INR: 0.96 ratio         PTT - ( 22 Apr 2024 18:50 )  PTT:27.1 sec  CARDIAC MARKERS ( 22 Apr 2024 20:29 )  x     / x     / 95 U/L / x     / 1.6 ng/mL  CARDIAC MARKERS ( 22 Apr 2024 18:50 )  x     / x     / 81 U/L / x     / <1.0 ng/mL      Urinalysis Basic - ( 23 Apr 2024 07:18 )    Color: x / Appearance: x / SG: x / pH: x  Gluc: 89 mg/dL / Ketone: x  / Bili: x / Urobili: x   Blood: x / Protein: x / Nitrite: x   Leuk Esterase: x / RBC: x / WBC x   Sq Epi: x / Non Sq Epi: x / Bacteria: x      Lactate, Blood: 1.5 mmol/L (04-23 @ 08:45)  Lactate, Blood: 2.3 mmol/L (04-22 @ 23:26)      04-22 @ 20:19  3.9  34      Thyroid Stimulating Hormone, Serum: 4.39 uIU/mL (04-23 @ 09:08)          Consultant(s) Notes Reviewed:      Care Discussed with Consultants/Other Providers:

## 2024-04-24 NOTE — PROGRESS NOTE ADULT - SUBJECTIVE AND OBJECTIVE BOX
Patient seen and examined at bedside.    Overnight Events:   ILENE   Remains in CHB with VR 30-40s on telemetry.     REVIEW OF SYSTEMS:  as above.     Current Meds:  acetaminophen     Tablet .. 650 milliGRAM(s) Oral every 6 hours PRN  budesonide 160 MICROgram(s)/formoterol 4.5 MICROgram(s) Inhaler 2 Puff(s) Inhalation two times a day  calamine/zinc oxide Lotion 1 Application(s) Topical two times a day  cefTRIAXone   IVPB 2000 milliGRAM(s) IV Intermittent every 24 hours  dextrose 5% + sodium chloride 0.9%. 1000 milliLiter(s) IV Continuous <Continuous>  gabapentin 600 milliGRAM(s) Oral three times a day  mirabegron ER 25 milliGRAM(s) Oral daily  montelukast 10 milliGRAM(s) Oral daily      Vitals:  T(F): 98.5 (04-24), Max: 99.3 (04-23)  HR: 35 (04-24) (35 - 41)  BP: 110/68 (04-24) (110/68 - 123/72)  RR: 18 (04-24)  SpO2: 94% (04-24)  I&O's Summary    23 Apr 2024 07:01  -  24 Apr 2024 07:00  --------------------------------------------------------  IN: 775 mL / OUT: 1140 mL / NET: -365 mL        Physical Exam:  Appearance: No acute distress; well appearing  Eyes: PERRL, EOMI, pink conjunctiva  HEENT: Normal oral mucosa  Cardiovascular: bradycardic, S1, S2, no murmurs, rubs, or gallops; no edema; no JVD  Respiratory: Clear to auscultation bilaterally  Gastrointestinal: soft, non-tender, non-distended with normal bowel sounds  Musculoskeletal: No clubbing; no joint deformity   Neurologic: Non-focal  Lymphatic: No lymphadenopathy  Psychiatry: AAOx3, mood & affect appropriate  Skin: No rashes, ecchymoses, or cyanosis                          14.4   12.08 )-----------( 156      ( 24 Apr 2024 07:10 )             42.8     04-23    142  |  111<H>  |  10  ----------------------------<  89  4.0   |  18<L>  |  0.90    Ca    8.9      23 Apr 2024 07:18  Phos  2.7     04-23  Mg     2.1     04-23    TPro  5.6<L>  /  Alb  3.6  /  TBili  0.4  /  DBili  x   /  AST  23  /  ALT  79<H>  /  AlkPhos  73  04-23    PT/INR - ( 22 Apr 2024 18:50 )   PT: 11.3 sec;   INR: 0.96 ratio         PTT - ( 22 Apr 2024 18:50 )  PTT:27.1 sec  CARDIAC MARKERS ( 22 Apr 2024 20:29 )  x     / x     / 95 U/L / x     / 1.6 ng/mL  CARDIAC MARKERS ( 22 Apr 2024 18:50 )  x     / x     / 81 U/L / x     / <1.0 ng/mL              New ECG(s): Personally reviewed    Echo:    Stress Testing:     Cath:    New Imaging:    Interpretation of Telemetry:

## 2024-04-24 NOTE — PROGRESS NOTE ADULT - ASSESSMENT
54 yo man with history of obesity, asthma, and recent influenza infection s/p oseltamivir admitted with symptomatic bradycardia. 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 Remains hemodynamically stable.     EKG: CHB. RBBB escape. Vent rate 42bpm  TTE: normal LV systolic/diastolic function. Normal valves.   CT Chest: no lung pathology. no hilar lymphadenopathy   Lyme titers wnl     No clear reversible etiologies of conduction disease noted. Unlikely to be related to medications. Electrolytes wnl. Thyroid studies with subclinical hypothyroidism. No evidence of adrenal insufficiency. Unclear if related to recent viral illness, although no signs of myocardial involvement as TTE with normal LV systolic function and negative cardiac enzymes. Sarcoid is possible, but no evidence on CT chest, would need PET/CT. MRI would be helpful in diagnosis, but unlikely to  - also patient has metal in his neck as well as claustrophobia.   Given his young age and no clear etiology, he has progressive cardiac conduction disease and genetic testing should be considered.   Will also check inflammatory markers to assess for any disorders, although relatively unclear which diagnosis would fit at this time.   Also considering Lev-Lenegre disease, but usually in older patients.   Ischemia is relatively low on differential but is a common cause of conduction disorders.     Recommendations:  -monitor on telemetry   -check ESR/CRP  -avoid AVN blockers   -will try to order PET/CT  -will likely need PPM implantation; will discuss timing   -low threshold to transfer to CCU for TVP placement is becomes hemodynamically unstable or he develops worsening sx    Please see attending attestation for final recommendations.     Sanjeev Dixon MD  Cardiology Fellow

## 2024-04-25 PROBLEM — M54.9 DORSALGIA, UNSPECIFIED: Chronic | Status: ACTIVE | Noted: 2024-04-23

## 2024-04-25 PROBLEM — J45.909 UNSPECIFIED ASTHMA, UNCOMPLICATED: Chronic | Status: ACTIVE | Noted: 2024-04-23

## 2024-04-25 LAB
B BURGDOR DNA SPEC QL NAA+PROBE: NEGATIVE — SIGNIFICANT CHANGE UP
HCT VFR BLD CALC: 44 % — SIGNIFICANT CHANGE UP (ref 39–50)
HGB BLD-MCNC: 14.8 G/DL — SIGNIFICANT CHANGE UP (ref 13–17)
MCHC RBC-ENTMCNC: 29.9 PG — SIGNIFICANT CHANGE UP (ref 27–34)
MCHC RBC-ENTMCNC: 33.6 GM/DL — SIGNIFICANT CHANGE UP (ref 32–36)
MCV RBC AUTO: 88.9 FL — SIGNIFICANT CHANGE UP (ref 80–100)
NRBC # BLD: 0 /100 WBCS — SIGNIFICANT CHANGE UP (ref 0–0)
PLATELET # BLD AUTO: 165 K/UL — SIGNIFICANT CHANGE UP (ref 150–400)
RBC # BLD: 4.95 M/UL — SIGNIFICANT CHANGE UP (ref 4.2–5.8)
RBC # FLD: 15.4 % — HIGH (ref 10.3–14.5)
WBC # BLD: 11.6 K/UL — HIGH (ref 3.8–10.5)
WBC # FLD AUTO: 11.6 K/UL — HIGH (ref 3.8–10.5)

## 2024-04-25 PROCEDURE — 99231 SBSQ HOSP IP/OBS SF/LOW 25: CPT | Mod: GC

## 2024-04-25 RX ORDER — CYPROHEPTADINE HYDROCHLORIDE 4 MG/1
4 TABLET ORAL
Refills: 0 | Status: DISCONTINUED | OUTPATIENT
Start: 2024-04-25 | End: 2024-05-01

## 2024-04-25 RX ORDER — POLYETHYLENE GLYCOL 3350 17 G/17G
17 POWDER, FOR SOLUTION ORAL DAILY
Refills: 0 | Status: DISCONTINUED | OUTPATIENT
Start: 2024-04-25 | End: 2024-05-01

## 2024-04-25 RX ORDER — LORATADINE 10 MG/1
10 TABLET ORAL DAILY
Refills: 0 | Status: DISCONTINUED | OUTPATIENT
Start: 2024-04-25 | End: 2024-04-25

## 2024-04-25 RX ORDER — SENNA PLUS 8.6 MG/1
2 TABLET ORAL AT BEDTIME
Refills: 0 | Status: DISCONTINUED | OUTPATIENT
Start: 2024-04-25 | End: 2024-05-01

## 2024-04-25 RX ADMIN — MONTELUKAST 10 MILLIGRAM(S): 4 TABLET, CHEWABLE ORAL at 12:55

## 2024-04-25 RX ADMIN — BUDESONIDE AND FORMOTEROL FUMARATE DIHYDRATE 2 PUFF(S): 160; 4.5 AEROSOL RESPIRATORY (INHALATION) at 18:13

## 2024-04-25 RX ADMIN — CALAMINE AND ZINC OXIDE AND PHENOL 1 APPLICATION(S): 160; 10 LOTION TOPICAL at 18:15

## 2024-04-25 RX ADMIN — GABAPENTIN 600 MILLIGRAM(S): 400 CAPSULE ORAL at 23:23

## 2024-04-25 RX ADMIN — CALAMINE AND ZINC OXIDE AND PHENOL 1 APPLICATION(S): 160; 10 LOTION TOPICAL at 05:31

## 2024-04-25 RX ADMIN — GABAPENTIN 600 MILLIGRAM(S): 400 CAPSULE ORAL at 05:31

## 2024-04-25 RX ADMIN — BUDESONIDE AND FORMOTEROL FUMARATE DIHYDRATE 2 PUFF(S): 160; 4.5 AEROSOL RESPIRATORY (INHALATION) at 05:31

## 2024-04-25 RX ADMIN — MIRABEGRON 25 MILLIGRAM(S): 50 TABLET, EXTENDED RELEASE ORAL at 21:04

## 2024-04-25 RX ADMIN — GABAPENTIN 600 MILLIGRAM(S): 400 CAPSULE ORAL at 18:14

## 2024-04-25 RX ADMIN — CYPROHEPTADINE HYDROCHLORIDE 4 MILLIGRAM(S): 4 TABLET ORAL at 18:14

## 2024-04-25 NOTE — PROGRESS NOTE ADULT - ASSESSMENT
52yo M w/ PMH of asthma, chronic back pain pw intermittent chest discomfort and SOB found to be in CHB.       WBC improved, TTE normal LV/RV function.     Blood cultures negative, CT chest no hilar adenopathy.     Continue Symbicort/ Neurontin and Singular.     Await PPM.

## 2024-04-25 NOTE — PROGRESS NOTE ADULT - SUBJECTIVE AND OBJECTIVE BOX
Date of Service: 04-25-24 @ 07:51           CARDIOLOGY     PROGRESS  NOTE   ________________________________________________    CHIEF COMPLAINT:Patient is a 53y old  Male who presents with a chief complaint of CHB (25 Apr 2024 07:30)  no complain  	  REVIEW OF SYSTEMS:  CONSTITUTIONAL: No fever, weight loss, or fatigue  EYES: No eye pain, visual disturbances, or discharge  ENT:  No difficulty hearing, tinnitus, vertigo; No sinus or throat pain  NECK: No pain or stiffness  RESPIRATORY: No cough, wheezing, chills or hemoptysis; No Shortness of Breath  CARDIOVASCULAR: No chest pain, palpitations, passing out, dizziness, or leg swelling  GASTROINTESTINAL: No abdominal or epigastric pain. No nausea, vomiting, or hematemesis; No diarrhea or constipation. No melena or hematochezia.  GENITOURINARY: No dysuria, frequency, hematuria, or incontinence  NEUROLOGICAL: No headaches, memory loss, loss of strength, numbness, or tremors  SKIN: No itching, burning, rashes, or lesions   LYMPH Nodes: No enlarged glands  ENDOCRINE: No heat or cold intolerance; No hair loss  MUSCULOSKELETAL: No joint pain or swelling; No muscle, back, or extremity pain  PSYCHIATRIC: No depression, anxiety, mood swings, or difficulty sleeping  HEME/LYMPH: No easy bruising, or bleeding gums  ALLERGY AND IMMUNOLOGIC: No hives or eczema	    [ ] All others negative	  [ ] Unable to obtain    PHYSICAL EXAM:  T(C): 36.8 (04-25-24 @ 04:44), Max: 36.8 (04-24-24 @ 11:27)  HR: 34 (04-25-24 @ 04:44) (33 - 44)  BP: 111/67 (04-25-24 @ 04:44) (106/55 - 120/76)  RR: 18 (04-25-24 @ 04:44) (18 - 18)  SpO2: 95% (04-25-24 @ 04:44) (94% - 97%)  Wt(kg): --  I&O's Summary    24 Apr 2024 07:01  -  25 Apr 2024 07:00  --------------------------------------------------------  IN: 500 mL / OUT: 1950 mL / NET: -1450 mL        Appearance: Normal	  HEENT:   Normal oral mucosa, PERRL, EOMI	  Lymphatic: No lymphadenopathy  Cardiovascular: Normal S1 S2, No JVD, + murmurs, No edema  Respiratory: rhonchi  Psychiatry: A & O x 3, Mood & affect appropriate  Gastrointestinal:  Soft, Non-tender, + BS	  Skin: No rashes, No ecchymoses, No cyanosis	  Neurologic: Non-focal  Extremities: Normal range of motion, No clubbing, cyanosis or edema  Vascular: Peripheral pulses palpable 2+ bilaterally    MEDICATIONS  (STANDING):  budesonide 160 MICROgram(s)/formoterol 4.5 MICROgram(s) Inhaler 2 Puff(s) Inhalation two times a day  calamine/zinc oxide Lotion 1 Application(s) Topical two times a day  cefTRIAXone   IVPB 2000 milliGRAM(s) IV Intermittent every 24 hours  gabapentin 600 milliGRAM(s) Oral three times a day  mirabegron ER 25 milliGRAM(s) Oral daily  montelukast 10 milliGRAM(s) Oral daily      TELEMETRY: 	    ECG:  	  RADIOLOGY:  OTHER: 	  	  LABS:	 	    CARDIAC MARKERS:                                14.8   11.60 )-----------( 165      ( 25 Apr 2024 06:44 )             44.0     04-24    142  |  110<H>  |  12  ----------------------------<  98  4.2   |  20<L>  |  0.94    Ca    9.1      24 Apr 2024 07:43      proBNP:   Lipid Profile:   HgA1c:   TSH: Thyroid Stimulating Hormone, Serum: 4.39 uIU/mL (04-23 @ 09:08)  Thyroid Stimulating Hormone, Serum: 4.13 uIU/mL (04-22 @ 18:50)      Pt recommended for urgent PET-CT of chest/heart. Called radiology office [phone], spoke to Naima. Script faxed over to [phone]. Office to call [address] [place] [address] tomorrow to confirm appt time.    Assessment and plan  ---------------------------  Pt is a 54yo M w/ PMH of asthma, chronic back pain pw intermittent chest discomfort and SOB.  Reports ~1 week's time of SOB and dizziness a/w intermittent chest discomfort for which he saw his PMD/cardiologist and underwent ECG notable for RBBB. Given his recent travel to [place] about a month ago he was advised to f/u w/ CTA PE protocol outpt to r/o PE given his symptoms which he has not gotten yet. On day of presentation he was at friend's home to and became very SOB and felt unwell when walking in and thus presented to [place] where he was noted to be in CHB and thus tx here to [facility].   He otherwise endorses having the flu about a month ago s/p tamiflu and a recent rash which he describes as blotches on his upper and lower extremities for which he saw a dermatologist and underwent 2 tapers of steroids which helped. He otherwise denies recent hiking though does report bike riding in the park, last in October of last year. Not on AV darcy blockade   pt with heart block ?etiology  check trop  agree with  lyme titer, negative  awaiting echo, noted  esr/ crp  tsh normal  ct , no lymphoadenopathy, echo no regional wall motion abnormality lyme titer negative ?pet scan if can be done as inpatient  doubt reversible cause, awaiting PET scan   check HIV test

## 2024-04-25 NOTE — PROGRESS NOTE ADULT - SUBJECTIVE AND OBJECTIVE BOX
Patient seen and examined at bedside.    Overnight Events:   NAEO   Planning for PET CT     REVIEW OF SYSTEMS:  as above.       Current Meds:  acetaminophen     Tablet .. 650 milliGRAM(s) Oral every 6 hours PRN  budesonide 160 MICROgram(s)/formoterol 4.5 MICROgram(s) Inhaler 2 Puff(s) Inhalation two times a day  calamine/zinc oxide Lotion 1 Application(s) Topical two times a day  cefTRIAXone   IVPB 2000 milliGRAM(s) IV Intermittent every 24 hours  gabapentin 600 milliGRAM(s) Oral three times a day  mirabegron ER 25 milliGRAM(s) Oral daily  montelukast 10 milliGRAM(s) Oral daily      Vitals:  T(F): 98.2 (04-25), Max: 98.3 (04-24)  HR: 34 (04-25) (33 - 44)  BP: 111/67 (04-25) (106/55 - 120/76)  RR: 18 (04-25)  SpO2: 95% (04-25)  I&O's Summary    24 Apr 2024 07:01  -  25 Apr 2024 07:00  --------------------------------------------------------  IN: 500 mL / OUT: 1950 mL / NET: -1450 mL        Physical Exam:  Appearance: No acute distress; well appearing  Eyes: PERRL, EOMI, pink conjunctiva  HEENT: Normal oral mucosa  Cardiovascular: bradycardic, S1, S2, no murmurs, rubs, or gallops; no edema; no JVD  Respiratory: Clear to auscultation bilaterally  Gastrointestinal: soft, non-tender, non-distended with normal bowel sounds  Musculoskeletal: No clubbing; no joint deformity   Neurologic: Non-focal  Lymphatic: No lymphadenopathy  Psychiatry: AAOx3, mood & affect appropriate  Skin: No rashes, ecchymoses, or cyanosis                          14.8   11.60 )-----------( 165      ( 25 Apr 2024 06:44 )             44.0     04-24    142  |  110<H>  |  12  ----------------------------<  98  4.2   |  20<L>  |  0.94    Ca    9.1      24 Apr 2024 07:43      TTE   CONCLUSIONS:   1. Left ventricular cavity is normal in size. Left ventricular wallthickness is normal. Left ventricular systolic function is normal with an ejection fraction of 71 % by Reed's method of disks. There are no regional wall motion abnormalities seen.   2. Normal left ventricular diastolic function, with normal filling pressure.   3. Normal right ventricular cavity size, with normal wall thickness, and normal systolic function.   4. Normal left and right atrial size.   5. No significant valvular disease.   6. No pericardial effusion seen.   7. No prior echocardiogram is available for comparison.    ________________________________________________________________________________________  FINDINGS:     Left Ventricle:  The left ventricular cavity is normal in size. Left ventricular wall thickness is normal. Left ventricular systolic function is normal with a calculated ejection fraction of 71 % by the Reed's biplane method of disks. There are no regional wall motion abnormalities seen. There is normal left ventricular diastolic function, with normal filling pressure.     Right Ventricle:  The right ventricular cavity is normal in size, with normal wall thickness and normal systolic function. Tricuspid annular plane systolic excursion (TAPSE) is 2.9 cm (normal >=1.7 cm).     Left Atrium:  The left atrium is normal in size with an indexed volume of 18.97 ml/m².     Right Atrium:  The right atrium is normal in size.     Interatrial Septum:  The interatrial septum appears intact.     Aortic Valve:  The aortic valve appears trileaflet. There is no aortic valve stenosis. There is trace aortic regurgitation.     Mitral Valve:  Structurally normal mitral valve with normal leaflet excursion. There is no mitral valve stenosis. There is trace mitral regurgitation.     Tricuspid Valve:  Structurally normal tricuspid valve with normal leaflet excursion. There is trace tricuspid regurgitation.     Pulmonic Valve:  Structurally normal pulmonic valve with normal leaflet excursion. There is trace pulmonic regurgitation.     Aorta:  The aortic root appearsnormal in size.     Pericardium:  No pericardial effusion seen.     Systemic Veins:  The inferior vena cava is normal in size (normal <2.1cm) with normal inspiratory collapse (normal >50%) consistent with normal right atrial pressure (~3, range 0-5mmHg).  _________________________________________________

## 2024-04-25 NOTE — PHARMACOTHERAPY INTERVENTION NOTE - COMMENTS
52 y/o male with PMH asthma, chronic back pain, transferred from \[facility]\"" for complete heart block. Endorses starting to have CP/SOB about 1 week ago; was seen by his PMD/cardiologist who noted a new RBBB and referred for outpatient CTA chest for this Friday. Had worsening CP/SOB this evening at dinner and presented to \[facility]\"" where he was found to be in CHB and transferred for EP evaluation.    Patient is currently on ceftriaxone 2g IV Q24H empirically for possible lyme carditis. Lyme titers from 4/22/24 returned negative. Recommend discontinuing ceftriaxone as lyme titers are negative and patient has no other signs of infection.     Chicho Au, PharmD  Transitions of Care Pharmacist  Available on [employer] Teams  Spectra #[phone]
Confirmed home medications with patient and pharmacy, updated in Outpatient Medication Review.    Home Medications:  Allegra D OTC 24HR 180 mg-240 mg oral tablet, extended release: 1 tab(s) orally once a day as needed for  allergy symptoms  gabapentin 600 mg oral tablet: 1 tab(s) orally 3 times a day  montelukast 10 mg oral tablet: 1 tab(s) orally once a day  Symbicort 160 mcg-4.5 mcg/inh inhalation aerosol: 2 puff(s) inhaled 2 times a day    Added:  Myrbetriq 50 mg oral tablet, extended release: 1 tab(s) orally once a day  tiZANidine 4 mg oral tablet: 2 tab(s) orally once a day (at bedtime)    Arjun AndersenD  Transitions of Care Pharmacist  Available on [employer] Teams  Spectra #[phone]

## 2024-04-25 NOTE — PROGRESS NOTE ADULT - ASSESSMENT
52 yo man with history of obesity, asthma, and recent influenza infection s/p oseltamivir admitted with symptomatic bradycardia. 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 Remains hemodynamically stable.     EKG: CHB. RBBB escape. Vent rate 42bpm  TTE: normal LV systolic/diastolic function. Normal valves.   CT Chest: no lung pathology. no hilar lymphadenopathy   Lyme titers wnl     No clear reversible etiologies of conduction disease noted. Unlikely to be related to medications. Electrolytes wnl. Thyroid studies with subclinical hypothyroidism. No evidence of adrenal insufficiency. Unclear if related to recent viral illness, although no signs of myocardial involvement as TTE with normal LV systolic function and negative cardiac enzymes. Inflammatory markers are wnl.   Sarcoid is possible, but no evidence on CT chest, would need PET/CT. MRI would be helpful in diagnosis, but unlikely to  - also patient has metal in his neck as well as claustrophobia.   Given his young age and no clear etiology, he has progressive cardiac conduction disease and genetic testing should be considered.   Also considering Lev-Lenegre disease, but usually in older patients.   Ischemia is relatively low on differential but is a common cause of conduction disorders.     Recommendations:  -monitor on telemetry   -avoid AVN blockers   -will try to order PET/CT  -will likely need PPM implantation; will discuss timing   -low threshold to transfer to CCU for TVP placement is becomes hemodynamically unstable or he develops worsening sx    Please see attending attestation for final recommendations.     Sanjeev Dixon MD  Cardiology Fellow

## 2024-04-25 NOTE — CHART NOTE - NSCHARTNOTEFT_GEN_A_CORE
Pt planned for  cardiac PET scan at [address] [place] [address] on 04/29/2024 at 1045 .  Pt needs  to be NPO  from  4 pm on 04/28   Needs to be on a no carbohydrate diet  from 7 AM till 4 PM , on 04/28 , dietary restrictions reviewed  with Pt  and  RN.  EP fellow aware and DR Cherry aware .   Cuate Johnson NP-C [id number]

## 2024-04-25 NOTE — PROGRESS NOTE ADULT - SUBJECTIVE AND OBJECTIVE BOX
INTERVAL HPI/OVERNIGHT EVENTS:  Pt seen and examined at bedside.     Allergies/Intolerance: No Known Allergies      MEDICATIONS  (STANDING):  budesonide 160 MICROgram(s)/formoterol 4.5 MICROgram(s) Inhaler 2 Puff(s) Inhalation two times a day  calamine/zinc oxide Lotion 1 Application(s) Topical two times a day  cefTRIAXone   IVPB 2000 milliGRAM(s) IV Intermittent every 24 hours  gabapentin 600 milliGRAM(s) Oral three times a day  mirabegron ER 25 milliGRAM(s) Oral daily  montelukast 10 milliGRAM(s) Oral daily    MEDICATIONS  (PRN):  acetaminophen     Tablet .. 650 milliGRAM(s) Oral every 6 hours PRN Temp greater or equal to 38C (100.4F), Mild Pain (1 - 3)        ROS: all systems reviewed and wnl      PHYSICAL EXAMINATION:  Vital Signs Last 24 Hrs  T(C): 36.8 (25 Apr 2024 04:44), Max: 36.8 (24 Apr 2024 11:27)  T(F): 98.2 (25 Apr 2024 04:44), Max: 98.3 (24 Apr 2024 11:27)  HR: 34 (25 Apr 2024 04:44) (33 - 44)  BP: 111/67 (25 Apr 2024 04:44) (106/55 - 120/76)  BP(mean): 72 (24 Apr 2024 11:27) (72 - 72)  RR: 18 (25 Apr 2024 04:44) (18 - 18)  SpO2: 95% (25 Apr 2024 04:44) (94% - 97%)    Parameters below as of 25 Apr 2024 04:44  Patient On (Oxygen Delivery Method): room air      CAPILLARY BLOOD GLUCOSE          04-24 @ 07:01  -  04-25 @ 07:00  --------------------------------------------------------  IN: 500 mL / OUT: 1950 mL / NET: -1450 mL        GENERAL: stable, no CP or SOB  NECK: supple, No JVD  CHEST/LUNG: clear to auscultation bilaterally; no rales, rhonchi, or wheezing b/l  HEART: normal S1, S2  ABDOMEN: BS+, soft, ND, NT   EXTREMITIES:  pulses palpable; no clubbing, cyanosis, or edema b/l LEs    LABS:                        14.8   11.60 )-----------( 165      ( 25 Apr 2024 06:44 )             44.0     04-24    142  |  110<H>  |  12  ----------------------------<  98  4.2   |  20<L>  |  0.94    Ca    9.1      24 Apr 2024 07:43        Urinalysis Basic - ( 24 Apr 2024 07:43 )    Color: x / Appearance: x / SG: x / pH: x  Gluc: 98 mg/dL / Ketone: x  / Bili: x / Urobili: x   Blood: x / Protein: x / Nitrite: x   Leuk Esterase: x / RBC: x / WBC x   Sq Epi: x / Non Sq Epi: x / Bacteria: x

## 2024-04-26 LAB
ANION GAP SERPL CALC-SCNC: 13 MMOL/L — SIGNIFICANT CHANGE UP (ref 5–17)
BUN SERPL-MCNC: 11 MG/DL — SIGNIFICANT CHANGE UP (ref 7–23)
CALCIUM SERPL-MCNC: 9.6 MG/DL — SIGNIFICANT CHANGE UP (ref 8.4–10.5)
CHLORIDE SERPL-SCNC: 106 MMOL/L — SIGNIFICANT CHANGE UP (ref 96–108)
CO2 SERPL-SCNC: 22 MMOL/L — SIGNIFICANT CHANGE UP (ref 22–31)
CREAT SERPL-MCNC: 0.93 MG/DL — SIGNIFICANT CHANGE UP (ref 0.5–1.3)
EGFR: 98 ML/MIN/1.73M2 — SIGNIFICANT CHANGE UP
GLUCOSE SERPL-MCNC: 88 MG/DL — SIGNIFICANT CHANGE UP (ref 70–99)
HCT VFR BLD CALC: 47.3 % — SIGNIFICANT CHANGE UP (ref 39–50)
HGB BLD-MCNC: 15.3 G/DL — SIGNIFICANT CHANGE UP (ref 13–17)
HIV 1+2 AB+HIV1 P24 AG SERPL QL IA: SIGNIFICANT CHANGE UP
MAGNESIUM SERPL-MCNC: 2.2 MG/DL — SIGNIFICANT CHANGE UP (ref 1.6–2.6)
MCHC RBC-ENTMCNC: 29.4 PG — SIGNIFICANT CHANGE UP (ref 27–34)
MCHC RBC-ENTMCNC: 32.3 GM/DL — SIGNIFICANT CHANGE UP (ref 32–36)
MCV RBC AUTO: 90.8 FL — SIGNIFICANT CHANGE UP (ref 80–100)
NRBC # BLD: 0 /100 WBCS — SIGNIFICANT CHANGE UP (ref 0–0)
PLATELET # BLD AUTO: 174 K/UL — SIGNIFICANT CHANGE UP (ref 150–400)
POTASSIUM SERPL-MCNC: 4.4 MMOL/L — SIGNIFICANT CHANGE UP (ref 3.5–5.3)
POTASSIUM SERPL-SCNC: 4.4 MMOL/L — SIGNIFICANT CHANGE UP (ref 3.5–5.3)
RBC # BLD: 5.21 M/UL — SIGNIFICANT CHANGE UP (ref 4.2–5.8)
RBC # FLD: 15.1 % — HIGH (ref 10.3–14.5)
SODIUM SERPL-SCNC: 141 MMOL/L — SIGNIFICANT CHANGE UP (ref 135–145)
WBC # BLD: 13.1 K/UL — HIGH (ref 3.8–10.5)
WBC # FLD AUTO: 13.1 K/UL — HIGH (ref 3.8–10.5)

## 2024-04-26 PROCEDURE — 99231 SBSQ HOSP IP/OBS SF/LOW 25: CPT | Mod: GC

## 2024-04-26 RX ORDER — HEPARIN SODIUM 5000 [USP'U]/ML
5000 INJECTION INTRAVENOUS; SUBCUTANEOUS EVERY 12 HOURS
Refills: 0 | Status: DISCONTINUED | OUTPATIENT
Start: 2024-04-26 | End: 2024-05-01

## 2024-04-26 RX ORDER — HYDROXYZINE HCL 10 MG
25 TABLET ORAL ONCE
Refills: 0 | Status: COMPLETED | OUTPATIENT
Start: 2024-04-26 | End: 2024-04-26

## 2024-04-26 RX ADMIN — CALAMINE AND ZINC OXIDE AND PHENOL 1 APPLICATION(S): 160; 10 LOTION TOPICAL at 18:16

## 2024-04-26 RX ADMIN — CALAMINE AND ZINC OXIDE AND PHENOL 1 APPLICATION(S): 160; 10 LOTION TOPICAL at 05:53

## 2024-04-26 RX ADMIN — BUDESONIDE AND FORMOTEROL FUMARATE DIHYDRATE 2 PUFF(S): 160; 4.5 AEROSOL RESPIRATORY (INHALATION) at 18:16

## 2024-04-26 RX ADMIN — GABAPENTIN 600 MILLIGRAM(S): 400 CAPSULE ORAL at 07:27

## 2024-04-26 RX ADMIN — CYPROHEPTADINE HYDROCHLORIDE 4 MILLIGRAM(S): 4 TABLET ORAL at 07:27

## 2024-04-26 RX ADMIN — Medication 1 APPLICATION(S): at 18:17

## 2024-04-26 RX ADMIN — BUDESONIDE AND FORMOTEROL FUMARATE DIHYDRATE 2 PUFF(S): 160; 4.5 AEROSOL RESPIRATORY (INHALATION) at 05:54

## 2024-04-26 RX ADMIN — GABAPENTIN 600 MILLIGRAM(S): 400 CAPSULE ORAL at 14:22

## 2024-04-26 RX ADMIN — MONTELUKAST 10 MILLIGRAM(S): 4 TABLET, CHEWABLE ORAL at 14:22

## 2024-04-26 RX ADMIN — HEPARIN SODIUM 5000 UNIT(S): 5000 INJECTION INTRAVENOUS; SUBCUTANEOUS at 18:18

## 2024-04-26 RX ADMIN — GABAPENTIN 600 MILLIGRAM(S): 400 CAPSULE ORAL at 21:09

## 2024-04-26 RX ADMIN — Medication 25 MILLIGRAM(S): at 11:21

## 2024-04-26 RX ADMIN — CYPROHEPTADINE HYDROCHLORIDE 4 MILLIGRAM(S): 4 TABLET ORAL at 18:15

## 2024-04-26 RX ADMIN — MIRABEGRON 25 MILLIGRAM(S): 50 TABLET, EXTENDED RELEASE ORAL at 21:10

## 2024-04-26 NOTE — PROGRESS NOTE ADULT - SUBJECTIVE AND OBJECTIVE BOX
Date of Service: 04-26-24 @ 08:41           CARDIOLOGY     PROGRESS  NOTE   ________________________________________________    CHIEF COMPLAINT:Patient is a 53y old  Male who presents with a chief complaint of CHB (25 Apr 2024 10:08)  no complain  	  REVIEW OF SYSTEMS:  CONSTITUTIONAL: No fever, weight loss, or fatigue  EYES: No eye pain, visual disturbances, or discharge  ENT:  No difficulty hearing, tinnitus, vertigo; No sinus or throat pain  NECK: No pain or stiffness  RESPIRATORY: No cough, wheezing, chills or hemoptysis; No Shortness of Breath  CARDIOVASCULAR: No chest pain, palpitations, passing out, dizziness, or leg swelling  GASTROINTESTINAL: No abdominal or epigastric pain. No nausea, vomiting, or hematemesis; No diarrhea or constipation. No melena or hematochezia.  GENITOURINARY: No dysuria, frequency, hematuria, or incontinence  NEUROLOGICAL: No headaches, memory loss, loss of strength, numbness, or tremors  SKIN: No itching, burning, rashes, or lesions   LYMPH Nodes: No enlarged glands  ENDOCRINE: No heat or cold intolerance; No hair loss  MUSCULOSKELETAL: No joint pain or swelling; No muscle, back, or extremity pain  PSYCHIATRIC: No depression, anxiety, mood swings, or difficulty sleeping  HEME/LYMPH: No easy bruising, or bleeding gums  ALLERGY AND IMMUNOLOGIC: No hives or eczema	    [ x] All others negative	  [ ] Unable to obtain    PHYSICAL EXAM:  T(C): 37.1 (04-26-24 @ 04:15), Max: 37.2 (04-25-24 @ 18:15)  HR: 38 (04-26-24 @ 04:15) (36 - 40)  BP: 108/64 (04-26-24 @ 04:15) (104/64 - 125/69)  RR: 18 (04-26-24 @ 04:15) (18 - 18)  SpO2: 96% (04-26-24 @ 04:15) (96% - 97%)  Wt(kg): --  I&O's Summary      Appearance: Normal	  HEENT:   Normal oral mucosa, PERRL, EOMI	  Lymphatic: No lymphadenopathy  Cardiovascular: Normal S1 S2, No JVD, +murmurs, No edema  Respiratory: rhonchi	  Psychiatry: A & O x 3, Mood & affect appropriate  Gastrointestinal:  Soft, Non-tender, + BS	  Skin: No rashes, No ecchymoses, No cyanosis	  Neurologic: Non-focal  Extremities: Normal range of motion, No clubbing, cyanosis or edema  Vascular: Peripheral pulses palpable 2+ bilaterally    MEDICATIONS  (STANDING):  budesonide 160 MICROgram(s)/formoterol 4.5 MICROgram(s) Inhaler 2 Puff(s) Inhalation two times a day  calamine/zinc oxide Lotion 1 Application(s) Topical two times a day  cyproheptadine 4 milliGRAM(s) Oral two times a day  gabapentin 600 milliGRAM(s) Oral three times a day  mirabegron ER 25 milliGRAM(s) Oral daily  montelukast 10 milliGRAM(s) Oral daily  polyethylene glycol 3350 17 Gram(s) Oral daily  senna 2 Tablet(s) Oral at bedtime      TELEMETRY: 	    ECG:  	  RADIOLOGY:  OTHER: 	  	  LABS:	 	    CARDIAC MARKERS:                                15.3   13.10 )-----------( 174      ( 26 Apr 2024 07:09 )             47.3     04-26    141  |  106  |  11  ----------------------------<  88  4.4   |  22  |  0.93    Ca    9.6      26 Apr 2024 07:09  Mg     2.2     04-26      proBNP:   Lipid Profile:   HgA1c:   TSH: Thyroid Stimulating Hormone, Serum: 4.39 uIU/mL (04-23 @ 09:08)  Thyroid Stimulating Hormone, Serum: 4.13 uIU/mL (04-22 @ 18:50)        Assessment and plan  ---------------------------  Pt is a 54yo M w/ PMH of asthma, chronic back pain pw intermittent chest discomfort and SOB.  Reports ~1 week's time of SOB and dizziness a/w intermittent chest discomfort for which he saw his PMD/cardiologist and underwent ECG notable for RBBB. Given his recent travel to [place] about a month ago he was advised to f/u w/ CTA PE protocol outpt to r/o PE given his symptoms which he has not gotten yet. On day of presentation he was at friend's home to and became very SOB and felt unwell when walking in and thus presented to [place] where he was noted to be in CHB and thus tx here to [facility].   He otherwise endorses having the flu about a month ago s/p tamiflu and a recent rash which he describes as blotches on his upper and lower extremities for which he saw a dermatologist and underwent 2 tapers of steroids which helped. He otherwise denies recent hiking though does report bike riding in the park, last in October of last year. Not on AV darcy blockade   pt with heart block ?etiology  check trop  agree with  lyme titer, negative  awaiting echo, noted  esr/ crp  tsh normal  ct , no lymphoadenopathy, echo no regional wall motion abnormality lyme titer negative ?pet scan if can be done as inpatient  doubt reversible cause, awaiting PET scan   check HIV test  awaiting PET scan on Sunday  start on dvt prophylaxis
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
## 2024-04-26 NOTE — PROGRESS NOTE ADULT - SUBJECTIVE AND OBJECTIVE BOX
Patient seen and examined at bedside.    Overnight Events:   ILENE   Remains in CHB on telemetry     REVIEW OF SYSTEMS:  as above        Current Meds:  acetaminophen     Tablet .. 650 milliGRAM(s) Oral every 6 hours PRN  budesonide 160 MICROgram(s)/formoterol 4.5 MICROgram(s) Inhaler 2 Puff(s) Inhalation two times a day  calamine/zinc oxide Lotion 1 Application(s) Topical two times a day  cyproheptadine 4 milliGRAM(s) Oral two times a day  gabapentin 600 milliGRAM(s) Oral three times a day  heparin   Injectable 5000 Unit(s) SubCutaneous every 12 hours  hydrOXYzine hydrochloride 25 milliGRAM(s) Oral once  mirabegron ER 25 milliGRAM(s) Oral daily  montelukast 10 milliGRAM(s) Oral daily  polyethylene glycol 3350 17 Gram(s) Oral daily  senna 2 Tablet(s) Oral at bedtime      Vitals:  T(F): 98.7 (04-26), Max: 99 (04-25)  HR: 38 (04-26) (36 - 40)  BP: 108/64 (04-26) (104/64 - 125/69)  RR: 18 (04-26)  SpO2: 96% (04-26)  I&O's Summary      Physical Exam:  Appearance: No acute distress; well appearing  Eyes: PERRL, EOMI, pink conjunctiva  HEENT: Normal oral mucosa  Cardiovascular: bradycardic, S1, S2, no murmurs, rubs, or gallops; no edema; no JVD  Respiratory: Clear to auscultation bilaterally  Gastrointestinal: soft, non-tender, non-distended with normal bowel sounds  Musculoskeletal: No clubbing; no joint deformity   Neurologic: Non-focal  Lymphatic: No lymphadenopathy  Psychiatry: AAOx3, mood & affect appropriate  Skin: No rashes, ecchymoses, or cyanosis                          15.3   13.10 )-----------( 174      ( 26 Apr 2024 07:09 )             47.3     04-26    141  |  106  |  11  ----------------------------<  88  4.4   |  22  |  0.93    Ca    9.6      26 Apr 2024 07:09  Mg     2.2     04-26        TTE   CONCLUSIONS:   1. Left ventricular cavity is normal in size. Left ventricular wallthickness is normal. Left ventricular systolic function is normal with an ejection fraction of 71 % by Reed's method of disks. There are no regional wall motion abnormalities seen.   2. Normal left ventricular diastolic function, with normal filling pressure.   3. Normal right ventricular cavity size, with normal wall thickness, and normal systolic function.   4. Normal left and right atrial size.   5. No significant valvular disease.   6. No pericardial effusion seen.   7. No prior echocardiogram is available for comparison.    ________________________________________________________________________________________  FINDINGS:     Left Ventricle:  The left ventricular cavity is normal in size. Left ventricular wall thickness is normal. Left ventricular systolic function is normal with a calculated ejection fraction of 71 % by the Reed's biplane method of disks. There are no regional wall motion abnormalities seen. There is normal left ventricular diastolic function, with normal filling pressure.     Right Ventricle:  The right ventricular cavity is normal in size, with normal wall thickness and normal systolic function. Tricuspid annular plane systolic excursion (TAPSE) is 2.9 cm (normal >=1.7 cm).     Left Atrium:  The left atrium is normal in size with an indexed volume of 18.97 ml/m².     Right Atrium:  The right atrium is normal in size.     Interatrial Septum:  The interatrial septum appears intact.     Aortic Valve:  The aortic valve appears trileaflet. There is no aortic valve stenosis. There is trace aortic regurgitation.     Mitral Valve:  Structurally normal mitral valve with normal leaflet excursion. There is no mitral valve stenosis. There is trace mitral regurgitation.     Tricuspid Valve:  Structurally normal tricuspid valve with normal leaflet excursion. There is trace tricuspid regurgitation.     Pulmonic Valve:  Structurally normal pulmonic valve with normal leaflet excursion. There is trace pulmonic regurgitation.     Aorta:  The aortic root appearsnormal in size.     Pericardium:  No pericardial effusion seen.     Systemic Veins:  The inferior vena cava is normal in size (normal <2.1cm) with normal inspiratory collapse (normal >50%) consistent with normal right atrial pressure (~3, range 0-5mmHg).  _________________________________________________   Patient seen and examined at bedside.    Overnight Events:   ILENE   Remains in CHB on telemetry   Aware of plan for PETCT monday   Denies any acute complaints.     REVIEW OF SYSTEMS:  as above        Current Meds:  acetaminophen     Tablet .. 650 milliGRAM(s) Oral every 6 hours PRN  budesonide 160 MICROgram(s)/formoterol 4.5 MICROgram(s) Inhaler 2 Puff(s) Inhalation two times a day  calamine/zinc oxide Lotion 1 Application(s) Topical two times a day  cyproheptadine 4 milliGRAM(s) Oral two times a day  gabapentin 600 milliGRAM(s) Oral three times a day  heparin   Injectable 5000 Unit(s) SubCutaneous every 12 hours  hydrOXYzine hydrochloride 25 milliGRAM(s) Oral once  mirabegron ER 25 milliGRAM(s) Oral daily  montelukast 10 milliGRAM(s) Oral daily  polyethylene glycol 3350 17 Gram(s) Oral daily  senna 2 Tablet(s) Oral at bedtime      Vitals:  T(F): 98.7 (04-26), Max: 99 (04-25)  HR: 38 (04-26) (36 - 40)  BP: 108/64 (04-26) (104/64 - 125/69)  RR: 18 (04-26)  SpO2: 96% (04-26)  I&O's Summary      Physical Exam:  Appearance: No acute distress; well appearing  Eyes: PERRL, EOMI, pink conjunctiva  HEENT: Normal oral mucosa  Cardiovascular: bradycardic, S1, S2, no murmurs, rubs, or gallops; no edema; no JVD  Respiratory: Clear to auscultation bilaterally  Gastrointestinal: soft, non-tender, non-distended with normal bowel sounds  Musculoskeletal: No clubbing; no joint deformity   Neurologic: Non-focal  Lymphatic: No lymphadenopathy  Psychiatry: AAOx3, mood & affect appropriate  Skin: No rashes, ecchymoses, or cyanosis                          15.3   13.10 )-----------( 174      ( 26 Apr 2024 07:09 )             47.3     04-26    141  |  106  |  11  ----------------------------<  88  4.4   |  22  |  0.93    Ca    9.6      26 Apr 2024 07:09  Mg     2.2     04-26        TTE   CONCLUSIONS:   1. Left ventricular cavity is normal in size. Left ventricular wallthickness is normal. Left ventricular systolic function is normal with an ejection fraction of 71 % by Reed's method of disks. There are no regional wall motion abnormalities seen.   2. Normal left ventricular diastolic function, with normal filling pressure.   3. Normal right ventricular cavity size, with normal wall thickness, and normal systolic function.   4. Normal left and right atrial size.   5. No significant valvular disease.   6. No pericardial effusion seen.   7. No prior echocardiogram is available for comparison.    ________________________________________________________________________________________  FINDINGS:     Left Ventricle:  The left ventricular cavity is normal in size. Left ventricular wall thickness is normal. Left ventricular systolic function is normal with a calculated ejection fraction of 71 % by the Reed's biplane method of disks. There are no regional wall motion abnormalities seen. There is normal left ventricular diastolic function, with normal filling pressure.     Right Ventricle:  The right ventricular cavity is normal in size, with normal wall thickness and normal systolic function. Tricuspid annular plane systolic excursion (TAPSE) is 2.9 cm (normal >=1.7 cm).     Left Atrium:  The left atrium is normal in size with an indexed volume of 18.97 ml/m².     Right Atrium:  The right atrium is normal in size.     Interatrial Septum:  The interatrial septum appears intact.     Aortic Valve:  The aortic valve appears trileaflet. There is no aortic valve stenosis. There is trace aortic regurgitation.     Mitral Valve:  Structurally normal mitral valve with normal leaflet excursion. There is no mitral valve stenosis. There is trace mitral regurgitation.     Tricuspid Valve:  Structurally normal tricuspid valve with normal leaflet excursion. There is trace tricuspid regurgitation.     Pulmonic Valve:  Structurally normal pulmonic valve with normal leaflet excursion. There is trace pulmonic regurgitation.     Aorta:  The aortic root appearsnormal in size.     Pericardium:  No pericardial effusion seen.     Systemic Veins:  The inferior vena cava is normal in size (normal <2.1cm) with normal inspiratory collapse (normal >50%) consistent with normal right atrial pressure (~3, range 0-5mmHg).  _________________________________________________

## 2024-04-26 NOTE — PROGRESS NOTE ADULT - ASSESSMENT
54 yo man with history of obesity, asthma, and recent influenza infection s/p oseltamivir admitted with symptomatic bradycardia. 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 Remains hemodynamically stable.     EKG: CHB. RBBB escape. Vent rate 42bpm  TTE: normal LV systolic/diastolic function. Normal valves.   CT Chest: no lung pathology. no hilar lymphadenopathy   Lyme titers wnl     No clear reversible etiologies of conduction disease noted. Unlikely to be related to medications. Electrolytes wnl. Thyroid studies with subclinical hypothyroidism. No evidence of adrenal insufficiency. Unclear if related to recent viral illness, although no signs of myocardial involvement as TTE with normal LV systolic function and negative cardiac enzymes. Inflammatory markers are wnl.   Sarcoid is possible, but no evidence on CT chest, would need PET/CT. MRI would be helpful in diagnosis, but unlikely to  - also patient has metal in his neck as well as claustrophobia.   Given his young age and no clear etiology, he has progressive cardiac conduction disease and genetic testing should be considered.   Also considering Lev-Lenegre disease, but usually in older patients.   Ischemia is relatively low on differential but is a common cause of conduction disorders.     Recommendations:  -monitor on telemetry   -avoid AVN blockers   -PET CT scheduled for 4/29 to r/o sarcoid   -will likely need PPM vs ICD pending PET CT result   -low threshold to transfer to CCU for TVP placement is becomes hemodynamically unstable or he develops worsening sx    Please see attending attestation for final recommendations.     Sanjeev Dixon MD  Cardiology Fellow

## 2024-04-26 NOTE — PROGRESS NOTE ADULT - SUBJECTIVE AND OBJECTIVE BOX
INTERVAL HPI/OVERNIGHT EVENTS:  Pt seen and examined at bedside.     Allergies/Intolerance: No Known Allergies      MEDICATIONS  (STANDING):  budesonide 160 MICROgram(s)/formoterol 4.5 MICROgram(s) Inhaler 2 Puff(s) Inhalation two times a day  calamine/zinc oxide Lotion 1 Application(s) Topical two times a day  cyproheptadine 4 milliGRAM(s) Oral two times a day  gabapentin 600 milliGRAM(s) Oral three times a day  heparin   Injectable 5000 Unit(s) SubCutaneous every 12 hours  hydrOXYzine hydrochloride 25 milliGRAM(s) Oral once  mirabegron ER 25 milliGRAM(s) Oral daily  montelukast 10 milliGRAM(s) Oral daily  polyethylene glycol 3350 17 Gram(s) Oral daily  senna 2 Tablet(s) Oral at bedtime    MEDICATIONS  (PRN):  acetaminophen     Tablet .. 650 milliGRAM(s) Oral every 6 hours PRN Temp greater or equal to 38C (100.4F), Mild Pain (1 - 3)        ROS: all systems reviewed and wnl      PHYSICAL EXAMINATION:  Vital Signs Last 24 Hrs  T(C): 37.1 (26 Apr 2024 04:15), Max: 37.2 (25 Apr 2024 18:15)  T(F): 98.7 (26 Apr 2024 04:15), Max: 99 (25 Apr 2024 18:15)  HR: 38 (26 Apr 2024 04:15) (36 - 40)  BP: 108/64 (26 Apr 2024 04:15) (104/64 - 125/69)  BP(mean): --  RR: 18 (26 Apr 2024 04:15) (18 - 18)  SpO2: 96% (26 Apr 2024 04:15) (96% - 97%)    Parameters below as of 26 Apr 2024 04:15  Patient On (Oxygen Delivery Method): room air      CAPILLARY BLOOD GLUCOSE            GENERAL: alert, comfortable at rest, no fevers, CP or SOB  NECK: supple, No JVD  CHEST/LUNG: clear to auscultation bilaterally; no rales, rhonchi, or wheezing b/l  HEART: normal S1, S2  ABDOMEN: BS+, soft, ND, NT   EXTREMITIES:  pulses palpable; no clubbing, cyanosis, or edema b/l LEs      LABS:                        15.3   13.10 )-----------( 174      ( 26 Apr 2024 07:09 )             47.3     04-26    141  |  106  |  11  ----------------------------<  88  4.4   |  22  |  0.93    Ca    9.6      26 Apr 2024 07:09  Mg     2.2     04-26        Urinalysis Basic - ( 26 Apr 2024 07:09 )    Color: x / Appearance: x / SG: x / pH: x  Gluc: 88 mg/dL / Ketone: x  / Bili: x / Urobili: x   Blood: x / Protein: x / Nitrite: x   Leuk Esterase: x / RBC: x / WBC x   Sq Epi: x / Non Sq Epi: x / Bacteria: x

## 2024-04-26 NOTE — CHART NOTE - NSCHARTNOTEFT_GEN_A_CORE
Pt is a 52yo M w/ PMH of asthma, chronic back pain pw intermittent chest discomfort and SOB.    Reports ~1 week's time of SOB and dizziness a/w intermittent chest discomfort for which he saw his PMD/cardiologist and underwent ECG notable for RBBB. Given his recent travel to [place] about a month ago he was advised to f/u w/ CTA PE protocol outpt to r/o PE given his symptoms which he has not gotten yet. On day of presentation he was at friend's home to and became very SOB and felt unwell when walking in and thus presented to [place] where he was noted to be in CHB and thus tx here to [facility].     He otherwise endorses having the flu about a month ago s/p tamiflu and a recent rash which he describes as blotches on his upper and lower extremities for which he saw a dermatologist and underwent 2 tapers of steroids which helped. He otherwise denies recent hiking though does report bike riding in the park, last in October of last year. Not on AV darcy blockade     In the ED darryl w/ mild leukocytosis to 15 s/p CTX 2g    Derm consulted for scabies rule out; has had rash for 2-3 years. Outpatient derm (Dr. Arriola) gave steroid tapers which helped. Also did a biopsy which was nonspecific. Topical steroids also helped a bit. Pt has also been treated with permethrin empirically for scabies which did not help. No weight loss, fatigue. Pt regularly exercises, is a [employer].     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 Please dispense multiple tubes to cover body surface area.  - reached out to Dr. Arriola office for records    DWA: Dr. Shireen Felix MD  Resident Physician, PGY2  [facility]  Pager: [phone]  Office: [phone]. Pt is a 52yo M w/ PMH of asthma, chronic back pain pw intermittent chest discomfort and SOB.    Reports ~1 week's time of SOB and dizziness a/w intermittent chest discomfort for which he saw his PMD/cardiologist and underwent ECG notable for RBBB. Given his recent travel to [place] about a month ago he was advised to f/u w/ CTA PE protocol outpt to r/o PE given his symptoms which he has not gotten yet. On day of presentation he was at friend's home to and became very SOB and felt unwell when walking in and thus presented to [place] where he was noted to be in CHB and thus tx here to [facility].     He otherwise endorses having the flu about a month ago s/p tamiflu and a recent rash which he describes as blotches on his upper and lower extremities for which he saw a dermatologist and underwent 2 tapers of steroids which helped. He otherwise denies recent hiking though does report bike riding in the park, last in October of last year. Not on AV darcy blockade     In the ED darryl w/ mild leukocytosis to 15 s/p CTX 2g    Derm consulted for rash; has had rash for 2-3 years. Outpatient derm (Dr. Arriola) gave steroid tapers which helped. Also did a biopsy which was nonspecific. Topical steroids also helped a bit. Pt has also been treated with permethrin empirically for scabies which did not help. No weight loss, fatigue. Pt regularly exercises, is a [employer].     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 Please dispense multiple tubes to cover body surface area.  - reached out to Dr. Arriola office for records    DWA: Dr. Shireen Felix MD  Resident Physician, PGY2  [facility]  Pager: [phone]  Office: [phone].

## 2024-04-27 PROCEDURE — 99222 1ST HOSP IP/OBS MODERATE 55: CPT

## 2024-04-27 RX ORDER — NYSTATIN/TRIAMCINOLONE ACET
1 OINTMENT (GRAM) TOPICAL
Refills: 0 | Status: DISCONTINUED | OUTPATIENT
Start: 2024-04-27 | End: 2024-05-01

## 2024-04-27 RX ADMIN — CALAMINE AND ZINC OXIDE AND PHENOL 1 APPLICATION(S): 160; 10 LOTION TOPICAL at 05:19

## 2024-04-27 RX ADMIN — CYPROHEPTADINE HYDROCHLORIDE 4 MILLIGRAM(S): 4 TABLET ORAL at 05:18

## 2024-04-27 RX ADMIN — GABAPENTIN 600 MILLIGRAM(S): 400 CAPSULE ORAL at 05:18

## 2024-04-27 RX ADMIN — GABAPENTIN 600 MILLIGRAM(S): 400 CAPSULE ORAL at 13:23

## 2024-04-27 RX ADMIN — MIRABEGRON 25 MILLIGRAM(S): 50 TABLET, EXTENDED RELEASE ORAL at 21:34

## 2024-04-27 RX ADMIN — MONTELUKAST 10 MILLIGRAM(S): 4 TABLET, CHEWABLE ORAL at 13:21

## 2024-04-27 RX ADMIN — BUDESONIDE AND FORMOTEROL FUMARATE DIHYDRATE 2 PUFF(S): 160; 4.5 AEROSOL RESPIRATORY (INHALATION) at 05:19

## 2024-04-27 RX ADMIN — CYPROHEPTADINE HYDROCHLORIDE 4 MILLIGRAM(S): 4 TABLET ORAL at 18:19

## 2024-04-27 RX ADMIN — HEPARIN SODIUM 5000 UNIT(S): 5000 INJECTION INTRAVENOUS; SUBCUTANEOUS at 17:55

## 2024-04-27 RX ADMIN — GABAPENTIN 600 MILLIGRAM(S): 400 CAPSULE ORAL at 21:35

## 2024-04-27 RX ADMIN — Medication 1 APPLICATION(S): at 17:56

## 2024-04-27 RX ADMIN — Medication 650 MILLIGRAM(S): at 16:43

## 2024-04-27 RX ADMIN — BUDESONIDE AND FORMOTEROL FUMARATE DIHYDRATE 2 PUFF(S): 160; 4.5 AEROSOL RESPIRATORY (INHALATION) at 17:55

## 2024-04-27 RX ADMIN — Medication 1 APPLICATION(S): at 05:19

## 2024-04-27 RX ADMIN — Medication 650 MILLIGRAM(S): at 18:04

## 2024-04-27 RX ADMIN — HEPARIN SODIUM 5000 UNIT(S): 5000 INJECTION INTRAVENOUS; SUBCUTANEOUS at 05:19

## 2024-04-27 NOTE — CONSULT NOTE ADULT - ASSESSMENT
ASSESSMENT AND PLAN:  dermatitis likely atopic, follicular eczema  -morphology not consistent with scabies or sarcoidosis   - labs reviewed, no anemia, TSH high, but T4 wnl, calcium wnl, high WBC could be in setting of recent prednisone use  - START triamcinolone 0.1% ointment BID to affected areas (never to the face/groin/skin folds) for up to 2 weeks on, then 1 week off. Please dispense multiple tubes to cover body surface area.  - recommended gentle skin care practices, fragrance free moisturizers and soaps. short showers, warm water not hot water. recommended products to pt  - reached out to Dr. Arriola office for records    The patient's chart was reviewed in addition to being seen and examined at bedside with the dermatology attending Dr. Iyer.  Recommendations were communicated with the primary team.    Please page [phone] with a 10-digit call-back number for further related questions.  Please re-consult Dermatology for any new or worsening developments.    Zelalem Felix MD  Resident Physician, PGY-2  [facility]  Pager: [phone]  Office: [phone] ASSESSMENT AND PLAN:  dermatitis likely atopic, follicular eczema  -morphology not consistent with scabies or sarcoidosis   - labs reviewed, no anemia, TSH high, but T4 wnl, calcium wnl, high WBC could be in setting of recent prednisone use  - START triamcinolone 0.1% ointment BID to affected areas (never to the face/groin/skin folds) for up to 2 weeks on, then 1 week off. Please dispense multiple tubes to cover body surface area.  - recommended gentle skin care practices, fragrance free moisturizers and soaps. short showers, warm water not hot water. recommended products to pt  - reached out to Dr. Arriola office for records    Patient can follow up with us in the [facility] located at [address] [place] [address] upon discharge. Please instruct patient to call our office. Office phone number is [phone].    The patient's chart was reviewed in addition to being seen and examined at bedside with the dermatology attending Dr. Iyer.  Recommendations were communicated with the primary team.    Please page [phone] with a 10-digit call-back number for further related questions.  Please re-consult Dermatology for any new or worsening developments.    Zelalem Felix MD  Resident Physician, PGY-2  [facility]  Pager: [phone]  Office: [phone]

## 2024-04-27 NOTE — CONSULT NOTE ADULT - SUBJECTIVE AND OBJECTIVE BOX
HPI:  Pt is a 54yo M w/ PMH of asthma, chronic back pain pw intermittent chest discomfort and SOB.    Reports ~1 week's time of SOB and dizziness a/w intermittent chest discomfort for which he saw his PMD/cardiologist and underwent ECG notable for RBBB. Given his recent travel to [place] about a month ago he was advised to f/u w/ CTA PE protocol outpt to r/o PE given his symptoms which he has not gotten yet. On day of presentation he was at friend's home to and became very SOB and felt unwell when walking in and thus presented to [place] where he was noted to be in CHB and thus tx here to [facility].     He otherwise endorses having the flu about a month ago s/p tamiflu and a recent rash which he describes as blotches on his upper and lower extremities for which he saw a dermatologist and underwent 2 tapers of steroids which helped. He otherwise denies recent hiking though does report bike riding in the park, last in October of last year. Not on AV darcy blockade     In the ED darryl w/ mild leukocytosis to 15 s/p CTX 2g   (22 Apr 2024 23:45)    derm consulted for itchy rash, reviewed above history. Pt has also been treated for scabies empirically with permethrin in the past. rash is chronic, flares and improves. does not moisturize, uses bath and body works soap. not sure what detergent he uses    PAST MEDICAL & SURGICAL HISTORY:  Asthma      Chronic back pain      S/P appendectomy          REVIEW OF SYSTEMS:  General: no fevers/chills; no lethargy  Skin/Breast: see HPI  Ophthalmologic: no eye pain or changes in vision  ENT: no dysphagia or changes in hearing  Respiratory: no SOB or cough  Cardiovascular: no palpitations or chest pain  Gastrointestinal: no abdominal pain or blood in stool  Genitourinary: no dysuria or frequency  Musculoskeletal: no joint pains or weakness  Neurological: no weakness, numbness, or tingling    MEDICATIONS  (STANDING):  budesonide 160 MICROgram(s)/formoterol 4.5 MICROgram(s) Inhaler 2 Puff(s) Inhalation two times a day  calamine/zinc oxide Lotion 1 Application(s) Topical two times a day  cyproheptadine 4 milliGRAM(s) Oral two times a day  gabapentin 600 milliGRAM(s) Oral three times a day  heparin   Injectable 5000 Unit(s) SubCutaneous every 12 hours  mirabegron ER 25 milliGRAM(s) Oral daily  montelukast 10 milliGRAM(s) Oral daily  nystatin/triamcinolone Ointment 1 Application(s) Topical two times a day  polyethylene glycol 3350 17 Gram(s) Oral daily  senna 2 Tablet(s) Oral at bedtime    MEDICATIONS  (PRN):  acetaminophen     Tablet .. 650 milliGRAM(s) Oral every 6 hours PRN Temp greater or equal to 38C (100.4F), Mild Pain (1 - 3)      Allergies    No Known Allergies    Intolerances        SOCIAL HISTORY:     hx smoking  non-smoker    FAMILY HISTORY:  FH: HTN (hypertension) (Father, Mother)        Vital Signs Last 24 Hrs  T(C): 36.8 (27 Apr 2024 12:45), Max: 37 (26 Apr 2024 21:00)  T(F): 98.3 (27 Apr 2024 12:45), Max: 98.6 (26 Apr 2024 21:00)  HR: 37 (27 Apr 2024 12:45) (35 - 40)  BP: 118/68 (27 Apr 2024 12:45) (101/56 - 118/68)  BP(mean): --  RR: 18 (27 Apr 2024 12:45) (18 - 18)  SpO2: 96% (27 Apr 2024 12:45) (93% - 96%)    Parameters below as of 27 Apr 2024 12:45  Patient On (Oxygen Delivery Method): room air        PHYSICAL EXAM:  The patient was in NAD.  OP showed no ulcerations.  There was no visible LAD.  Conjunctiva were non-injected.  There was no clubbing or edema of extremities.   The scalp, hair, face, eyebrows, lips, OP, neck, chest, back, extremities x4, and nails were examined.  There was no hyperhidrosis or bromhidrosis.     Of note on skin exam: pink rough plaque on R axilla, body with follicular pink papules, some eroded, fingerwebs, toewebs clear, umbilicus clear of any burrows    LABS:                        15.3   13.10 )-----------( 174      ( 26 Apr 2024 07:09 )             47.3     04-26    141  |  106  |  11  ----------------------------<  88  4.4   |  22  |  0.93    Ca    9.6      26 Apr 2024 07:09  Mg     2.2     04-26        Urinalysis Basic - ( 26 Apr 2024 07:09 )    Color: x / Appearance: x / SG: x / pH: x  Gluc: 88 mg/dL / Ketone: x  / Bili: x / Urobili: x   Blood: x / Protein: x / Nitrite: x   Leuk Esterase: x / RBC: x / WBC x   Sq Epi: x / Non Sq Epi: x / Bacteria: x        RADIOLOGY & ADDITIONAL STUDIES: reviewed; no pertinent findings

## 2024-04-27 NOTE — PROGRESS NOTE ADULT - SUBJECTIVE AND OBJECTIVE BOX
INTERVAL HPI/OVERNIGHT EVENTS:  Pt seen and examined at bedside.     Allergies/Intolerance: No Known Allergies      MEDICATIONS  (STANDING):  budesonide 160 MICROgram(s)/formoterol 4.5 MICROgram(s) Inhaler 2 Puff(s) Inhalation two times a day  calamine/zinc oxide Lotion 1 Application(s) Topical two times a day  cyproheptadine 4 milliGRAM(s) Oral two times a day  gabapentin 600 milliGRAM(s) Oral three times a day  heparin   Injectable 5000 Unit(s) SubCutaneous every 12 hours  mirabegron ER 25 milliGRAM(s) Oral daily  montelukast 10 milliGRAM(s) Oral daily  polyethylene glycol 3350 17 Gram(s) Oral daily  senna 2 Tablet(s) Oral at bedtime  triamcinolone 0.1% Cream 1 Application(s) Topical two times a day    MEDICATIONS  (PRN):  acetaminophen     Tablet .. 650 milliGRAM(s) Oral every 6 hours PRN Temp greater or equal to 38C (100.4F), Mild Pain (1 - 3)        ROS: all systems reviewed and wnl      PHYSICAL EXAMINATION:  Vital Signs Last 24 Hrs  T(C): 36.8 (27 Apr 2024 04:09), Max: 37 (26 Apr 2024 21:00)  T(F): 98.3 (27 Apr 2024 04:09), Max: 98.6 (26 Apr 2024 21:00)  HR: 35 (27 Apr 2024 04:09) (35 - 40)  BP: 110/59 (27 Apr 2024 04:09) (101/56 - 120/71)  BP(mean): --  RR: 18 (27 Apr 2024 04:09) (18 - 18)  SpO2: 93% (27 Apr 2024 04:09) (93% - 97%)    Parameters below as of 27 Apr 2024 04:09  Patient On (Oxygen Delivery Method): room air      CAPILLARY BLOOD GLUCOSE          04-26 @ 07:01  -  04-27 @ 07:00  --------------------------------------------------------  IN: 240 mL / OUT: 0 mL / NET: 240 mL        GENERAL:   NECK: supple, No JVD  CHEST/LUNG: clear to auscultation bilaterally; no rales, rhonchi, or wheezing b/l  HEART: normal S1, S2  ABDOMEN: BS+, soft, ND, NT   EXTREMITIES:  pulses palpable; no clubbing, cyanosis, or edema b/l LEs  SKIN: no rashes or lesions      LABS:                        15.3   13.10 )-----------( 174      ( 26 Apr 2024 07:09 )             47.3     04-26    141  |  106  |  11  ----------------------------<  88  4.4   |  22  |  0.93    Ca    9.6      26 Apr 2024 07:09  Mg     2.2     04-26        Urinalysis Basic - ( 26 Apr 2024 07:09 )    Color: x / Appearance: x / SG: x / pH: x  Gluc: 88 mg/dL / Ketone: x  / Bili: x / Urobili: x   Blood: x / Protein: x / Nitrite: x   Leuk Esterase: x / RBC: x / WBC x   Sq Epi: x / Non Sq Epi: x / Bacteria: x             INTERVAL HPI/OVERNIGHT EVENTS:  Pt seen and examined at bedside.     Allergies/Intolerance: No Known Allergies      MEDICATIONS  (STANDING):  budesonide 160 MICROgram(s)/formoterol 4.5 MICROgram(s) Inhaler 2 Puff(s) Inhalation two times a day  calamine/zinc oxide Lotion 1 Application(s) Topical two times a day  cyproheptadine 4 milliGRAM(s) Oral two times a day  gabapentin 600 milliGRAM(s) Oral three times a day  heparin   Injectable 5000 Unit(s) SubCutaneous every 12 hours  mirabegron ER 25 milliGRAM(s) Oral daily  montelukast 10 milliGRAM(s) Oral daily  polyethylene glycol 3350 17 Gram(s) Oral daily  senna 2 Tablet(s) Oral at bedtime  triamcinolone 0.1% Cream 1 Application(s) Topical two times a day    MEDICATIONS  (PRN):  acetaminophen     Tablet .. 650 milliGRAM(s) Oral every 6 hours PRN Temp greater or equal to 38C (100.4F), Mild Pain (1 - 3)        ROS: all systems reviewed and wnl      PHYSICAL EXAMINATION:  Vital Signs Last 24 Hrs  T(C): 36.8 (27 Apr 2024 04:09), Max: 37 (26 Apr 2024 21:00)  T(F): 98.3 (27 Apr 2024 04:09), Max: 98.6 (26 Apr 2024 21:00)  HR: 35 (27 Apr 2024 04:09) (35 - 40)  BP: 110/59 (27 Apr 2024 04:09) (101/56 - 120/71)  BP(mean): --  RR: 18 (27 Apr 2024 04:09) (18 - 18)  SpO2: 93% (27 Apr 2024 04:09) (93% - 97%)    Parameters below as of 27 Apr 2024 04:09  Patient On (Oxygen Delivery Method): room air      CAPILLARY BLOOD GLUCOSE          04-26 @ 07:01  -  04-27 @ 07:00  --------------------------------------------------------  IN: 240 mL / OUT: 0 mL / NET: 240 mL        GENERAL: in bed, comfortable despite HR 30-40, no fevers, mild puritis  NECK: supple, No JVD  CHEST/LUNG: clear to auscultation bilaterally; no rales, rhonchi, or wheezing b/l  HEART: normal S1, S2  ABDOMEN: BS+, soft, ND, NT   EXTREMITIES:  pulses palpable; no clubbing, cyanosis, or edema b/l LEs    LABS:                        15.3   13.10 )-----------( 174      ( 26 Apr 2024 07:09 )             47.3     04-26    141  |  106  |  11  ----------------------------<  88  4.4   |  22  |  0.93    Ca    9.6      26 Apr 2024 07:09  Mg     2.2     04-26        Urinalysis Basic - ( 26 Apr 2024 07:09 )    Color: x / Appearance: x / SG: x / pH: x  Gluc: 88 mg/dL / Ketone: x  / Bili: x / Urobili: x   Blood: x / Protein: x / Nitrite: x   Leuk Esterase: x / RBC: x / WBC x   Sq Epi: x / Non Sq Epi: x / Bacteria: x

## 2024-04-27 NOTE — PROGRESS NOTE ADULT - ASSESSMENT
52 yo man with history of obesity, asthma, and recent influenza infection s/p oseltamivir admitted with symptomatic bradycardia. 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 Remains hemodynamically stable. No clear reversible etiologies of conduction disease noted. Unlikely to be related to medications. Electrolytes wnl. Thyroid studies with subclinical hypothyroidism. No evidence of adrenal insufficiency. Unclear if related to recent viral illness, although no signs of myocardial involvement as TTE with normal LV systolic function and negative cardiac enzymes. Inflammatory markers are wnl.   Sarcoid is possible, but no evidence on CT chest, would need PET/CT. MRI would be helpful in diagnosis, but unlikely to  - also patient has metal in his neck as well as claustrophobia.   Given his young age and no clear etiology, he has progressive cardiac conduction disease and genetic testing should be considered.   Also considering Lev-Lenegre disease, but usually in older patients.   Ischemia is relatively low on differential but is a common cause of conduction disorders.     EKG: CHB. RBBB escape. Vent rate 42bpm  TTE: normal LV systolic/diastolic function. Normal valves.   CT Chest: no lung pathology. no hilar lymphadenopathy   Lyme titers wnl     1. CHB    - SR with CHB 30-40s  - Avoid AVN blockers   - PET CT scheduled for 4/29 to r/o sarcoid   - Will need PPM vs ICD pending PET CT result   - Low threshold to transfer to CCU for TVP placement is becomes hemodynamically unstable or he develops worsening sx  - Close telemetry monitoring  - Maintain K>4.0 and Mg>2.0

## 2024-04-27 NOTE — PROGRESS NOTE ADULT - SUBJECTIVE AND OBJECTIVE BOX
Date of Service: 04-27-24 @ 10:23           CARDIOLOGY     PROGRESS  NOTE   ________________________________________________    CHIEF COMPLAINT:Patient is a 53y old  Male who presents with a chief complaint of CHB (27 Apr 2024 10:02)  no complain  	  REVIEW OF SYSTEMS:  CONSTITUTIONAL: No fever, weight loss, or fatigue  EYES: No eye pain, visual disturbances, or discharge  ENT:  No difficulty hearing, tinnitus, vertigo; No sinus or throat pain  NECK: No pain or stiffness  RESPIRATORY: No cough, wheezing, chills or hemoptysis; No Shortness of Breath  CARDIOVASCULAR: No chest pain, palpitations, passing out, dizziness, or leg swelling  GASTROINTESTINAL: No abdominal or epigastric pain. No nausea, vomiting, or hematemesis; No diarrhea or constipation. No melena or hematochezia.  GENITOURINARY: No dysuria, frequency, hematuria, or incontinence  NEUROLOGICAL: No headaches, memory loss, loss of strength, numbness, or tremors  SKIN: No itching, burning, rashes, or lesions   LYMPH Nodes: No enlarged glands  ENDOCRINE: No heat or cold intolerance; No hair loss  MUSCULOSKELETAL: No joint pain or swelling; No muscle, back, or extremity pain  PSYCHIATRIC: No depression, anxiety, mood swings, or difficulty sleeping  HEME/LYMPH: No easy bruising, or bleeding gums  ALLERGY AND IMMUNOLOGIC: No hives or eczema	    [ x] All others negative	  [ ] Unable to obtain    PHYSICAL EXAM:  T(C): 36.8 (04-27-24 @ 04:09), Max: 37 (04-26-24 @ 21:00)  HR: 35 (04-27-24 @ 04:09) (35 - 40)  BP: 110/59 (04-27-24 @ 04:09) (101/56 - 120/71)  RR: 18 (04-27-24 @ 04:09) (18 - 18)  SpO2: 93% (04-27-24 @ 04:09) (93% - 97%)  Wt(kg): --  I&O's Summary    26 Apr 2024 07:01  -  27 Apr 2024 07:00  --------------------------------------------------------  IN: 240 mL / OUT: 0 mL / NET: 240 mL        Appearance: Normal	  HEENT:   Normal oral mucosa, PERRL, EOMI	  Lymphatic: No lymphadenopathy  Cardiovascular: Normal S1 S2, No JVD, + murmurs, No edema  Respiratory: Lungs clear to auscultation	  Psychiatry: A & O x 3, Mood & affect appropriate  Gastrointestinal:  Soft, Non-tender, + BS	  Skin: No rashes, No ecchymoses, No cyanosis	  Neurologic: Non-focal  Extremities: Normal range of motion, No clubbing, cyanosis or edema  Vascular: Peripheral pulses palpable 2+ bilaterally    MEDICATIONS  (STANDING):  budesonide 160 MICROgram(s)/formoterol 4.5 MICROgram(s) Inhaler 2 Puff(s) Inhalation two times a day  calamine/zinc oxide Lotion 1 Application(s) Topical two times a day  cyproheptadine 4 milliGRAM(s) Oral two times a day  gabapentin 600 milliGRAM(s) Oral three times a day  heparin   Injectable 5000 Unit(s) SubCutaneous every 12 hours  mirabegron ER 25 milliGRAM(s) Oral daily  montelukast 10 milliGRAM(s) Oral daily  polyethylene glycol 3350 17 Gram(s) Oral daily  senna 2 Tablet(s) Oral at bedtime  triamcinolone 0.1% Cream 1 Application(s) Topical two times a day      TELEMETRY: 	    ECG:  	  RADIOLOGY:  OTHER: 	  	  LABS:	 	    CARDIAC MARKERS:                        15.3   13.10 )-----------( 174      ( 26 Apr 2024 07:09 )             47.3     04-26    141  |  106  |  11  ----------------------------<  88  4.4   |  22  |  0.93    Ca    9.6      26 Apr 2024 07:09  Mg     2.2     04-26      proBNP:   Lipid Profile:   HgA1c:   TSH: Thyroid Stimulating Hormone, Serum: 4.39 uIU/mL (04-23 @ 09:08)  Thyroid Stimulating Hormone, Serum: 4.13 uIU/mL (04-22 @ 18:50)        Assessment and plan  ---------------------------  Pt is a 52yo M w/ PMH of asthma, chronic back pain pw intermittent chest discomfort and SOB.  Reports ~1 week's time of SOB and dizziness a/w intermittent chest discomfort for which he saw his PMD/cardiologist and underwent ECG notable for RBBB. Given his recent travel to [place] about a month ago he was advised to f/u w/ CTA PE protocol outpt to r/o PE given his symptoms which he has not gotten yet. On day of presentation he was at friend's home to and became very SOB and felt unwell when walking in and thus presented to [place] where he was noted to be in CHB and thus tx here to [facility].   He otherwise endorses having the flu about a month ago s/p tamiflu and a recent rash which he describes as blotches on his upper and lower extremities for which he saw a dermatologist and underwent 2 tapers of steroids which helped. He otherwise denies recent hiking though does report bike riding in the park, last in October of last year. Not on AV darcy blockade   pt with heart block ?etiology  check trop  agree with  lyme titer, negative  awaiting echo, noted  esr/ crp  tsh normal  ct , no lymphoadenopathy, echo no regional wall motion abnormality lyme titer negative ?pet scan if can be done as inpatient  doubt reversible cause, awaiting PET scan   check HIV test  awaiting PET scan on Sunday  start on dvt prophylaxis  still in CHB, asymptomatic, check ACE leverl
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
## 2024-04-27 NOTE — PROGRESS NOTE ADULT - SUBJECTIVE AND OBJECTIVE BOX
24H hour events: No acute events    MEDICATIONS:  heparin   Injectable 5000 Unit(s) SubCutaneous every 12 hours  budesonide 160 MICROgram(s)/formoterol 4.5 MICROgram(s) Inhaler 2 Puff(s) Inhalation two times a day  cyproheptadine 4 milliGRAM(s) Oral two times a day  montelukast 10 milliGRAM(s) Oral daily  acetaminophen     Tablet .. 650 milliGRAM(s) Oral every 6 hours PRN  gabapentin 600 milliGRAM(s) Oral three times a day  polyethylene glycol 3350 17 Gram(s) Oral daily  senna 2 Tablet(s) Oral at bedtime  calamine/zinc oxide Lotion 1 Application(s) Topical two times a day  mirabegron ER 25 milliGRAM(s) Oral daily  triamcinolone 0.1% Cream 1 Application(s) Topical two times a day      REVIEW OF SYSTEMS:  Complete 12 point ROS negative.    PHYSICAL EXAM:  T(C): 36.8 (04-27-24 @ 04:09), Max: 37 (04-26-24 @ 21:00)  HR: 35 (04-27-24 @ 04:09) (35 - 40)  BP: 110/59 (04-27-24 @ 04:09) (101/56 - 120/71)  RR: 18 (04-27-24 @ 04:09) (18 - 18)  SpO2: 93% (04-27-24 @ 04:09) (93% - 97%)  Wt(kg): --  I&O's Summary    26 Apr 2024 07:01  -  27 Apr 2024 07:00  --------------------------------------------------------  IN: 240 mL / OUT: 0 mL / NET: 240 mL        Appearance: Normal	  HEENT:  PERRL, EOMI	  Cardiovascular: Normal S1 S2, No JVD, darryl, No murmurs, No edema  Respiratory: Lungs clear to auscultation	  Psychiatry: A & O x 3, Mood & affect appropriate  Gastrointestinal:  Soft, Non-tender, + BS	  Skin: No rashes, No ecchymoses, No cyanosis	  Neurologic: Non-focal  Extremities: No clubbing, cyanosis or edema  Vascular: Peripheral pulses palpable 2+ bilaterally        LABS:	 	    CBC Full  -  ( 26 Apr 2024 07:09 )  WBC Count : 13.10 K/uL  Hemoglobin : 15.3 g/dL  Hematocrit : 47.3 %  Platelet Count - Automated : 174 K/uL  Mean Cell Volume : 90.8 fl  Mean Cell Hemoglobin : 29.4 pg  Mean Cell Hemoglobin Concentration : 32.3 gm/dL  Auto Neutrophil # : x  Auto Lymphocyte # : x  Auto Monocyte # : x  Auto Eosinophil # : x  Auto Basophil # : x  Auto Neutrophil % : x  Auto Lymphocyte % : x  Auto Monocyte % : x  Auto Eosinophil % : x  Auto Basophil % : x    04-26    141  |  106  |  11  ----------------------------<  88  4.4   |  22  |  0.93    Ca    9.6      26 Apr 2024 07:09  Mg     2.2     04-26      TELEMETRY: SR with CHB 30-40s	      < from: TTE W or WO Ultrasound Enhancing Agent (04.23.24 @ 15:26) >  TRANSTHORACIC ECHOCARDIOGRAM REPORT  ________________________________________________________________________________                                      _______       Pt. Name:       WILLOW CERDA Study Date:    4/23/2024  MRN:            [id number]      YOB: 1970  Accession #:    [id number]      Age:           53 years  Account#:       [id number]   Gender:        M  Heart Rate:                    Height:        69.69 in (177.00 cm)  Rhythm:                        Weight:  184.00 lb (83.46 kg)  Blood Pressure: 125/85 mmHg    BSA/BMI:       2.01 m² / 26.64 kg/m²  ________________________________________________________________________________________  Referring Physician:    Miroslava Kelly  Interpreting Physician: Perry FLORES  Primary Sonographer:    Amber Webster RDCS    CPT:               3D RECONST W/O WKSTATION - 67878.m;ECHO TTE WO CON COMP W                     DOPP - 21658.m  Indication(s):     Chest pain, unspecified - R07.9  Procedure:         Transthoracic echocardiogram with 2-D, M-mode and complete                     spectral and color flow Doppler. Real time and full volume                     3-dimensional imaging performed at the echo machine.  Ordering Location: [facility]  Admission Status:  Inpatient  Study Information: Image quality for this study is adequate.    _______________________________________________________________________________________     CONCLUSIONS:      1. Left ventricular cavity is normal in size. Left ventricular wallthickness is normal. Left ventricular systolic function is normal with an ejection fraction of 71 % by Reed's method of disks. There are no regional wall motion abnormalities seen.   2. Normal left ventricular diastolic function, with normal filling pressure.   3. Normal right ventricular cavity size, with normal wall thickness, and normal systolic function.   4. Normal left and right atrial size.   5. No significant valvular disease.   6. No pericardial effusion seen.   7. No prior echocardiogram is available for comparison.    ________________________________________________________________________________________  FINDINGS:     Left Ventricle:  The left ventricular cavity is normal in size. Left ventricular wall thickness is normal. Left ventricular systolic function is normal with a calculated ejection fraction of 71 % by the Reed's biplane method of disks. There are no regional wall motion abnormalities seen. There is normal left ventricular diastolic function, with normal filling pressure.     Right Ventricle:  The right ventricular cavity is normal in size, with normal wall thickness and normal systolic function. Tricuspid annular plane systolic excursion (TAPSE) is 2.9 cm (normal >=1.7 cm).     Left Atrium:  The left atrium is normal in size with an indexed volume of 18.97 ml/m².     Right Atrium:  The right atrium is normal in size.     Interatrial Septum:  The interatrial septum appears intact.     Aortic Valve:  The aortic valve appears trileaflet. There is no aortic valve stenosis. There is trace aortic regurgitation.     Mitral Valve:  Structurally normal mitral valve with normal leaflet excursion. There is no mitral valve stenosis. There is trace mitral regurgitation.     Tricuspid Valve:  Structurally normal tricuspid valve with normal leaflet excursion. There is trace tricuspid regurgitation.     Pulmonic Valve:  Structurally normal pulmonic valve with normal leaflet excursion. There is trace pulmonic regurgitation.     Aorta:  The aortic root appearsnormal in size.     Pericardium:  No pericardial effusion seen.     Systemic Veins:  The inferior vena cava is normal in size (normal <2.1cm) with normal inspiratory collapse (normal >50%) consistent with normal right atrial pressure (~3, range 0-5mmHg).  ____________________________________________________________________  QUANTITATIVE DATA:  Left Ventricle Measurements: (Indexed to BSA)     IVSd (2D):   1.1 cm  LVPWd (2D):  1.2 cm  LVIDd (2D):  5.5 cm  LVIDs (2D):  3.5 cm  LV Mass:     256 g  127.6 g/m²  LV Vol d, MOD A2C: 98.9 ml  49.25 ml/m²  LV Vol d, MOD A4C: 110.0 ml 54.78 ml/m²  LV Vol d, MOD BP:  105.8 ml 52.70 ml/m²  LV Vol s, MOD A2C: 35.5 ml  17.68 ml/m²  LV Vol s, MOD A4C: 25.9 ml  12.90 ml/m²  LV Vol s, MOD BP:  30.9 ml  15.40 ml/m²  LVOT SV MOD BP:    74.9 ml  LV EF% MOD BP:     71 %     MV E Vmax:    1.14 m/s  MV A Vmax:    0.65 m/s  MV E/A:       1.75  e' lateral:   19.50 cm/s  e' medial:    12.70 cm/s  E/e' lateral: 5.85  E/e' medial:  8.98  E/e' Average: 7.08  MV DT:    218 msec    Aorta Measurements: (Normal range) (Indexed to BSA)     Sinuses of Valsalva: 3.10 cm (3.1 - 3.7 cm)       Left Atrium Measurements: (Indexed to BSA)  LA Diam 2D:        4.20 cm  LA Vol s, MOD A4C: 33.50 ml.  LA Vol s, MOD A2C: 41.40 ml.  LA Vol s, MOD BP:  38.10 ml  18.97 ml/m²    Right Ventricle Measurements:     TAPSE: 2.9 cm       LVOT / RVOT/ Qp/Qs Data: (Indexed to BSA)  LVOT Diameter:  2.10 cm  LVOT Area:      3.46 cm²  LVOT Vmax:      1.03 m/s  LVOT Vmn:       0.583 m/s  LVOT VTI:       17.70 cm  LVOT peak grad: 4 mmHg  LVOT mean grad: 2.0 mmHg  LVOT SV:        61.3 ml   30.53 ml/m²    Mitral Valve Measurements:     MV E Vmax: 1.1 m/s  MV A Vmax: 0.6 m/s  MV E/A:    1.8       Tricuspid Valve Measurements:     RA Pressure: 3 mmH    < end of copied text >

## 2024-04-28 LAB
A PHAGOCYTOPH IGG TITR SER IF: SIGNIFICANT CHANGE UP
ANION GAP SERPL CALC-SCNC: 12 MMOL/L — SIGNIFICANT CHANGE UP (ref 5–17)
ANION GAP SERPL CALC-SCNC: 12 MMOL/L — SIGNIFICANT CHANGE UP (ref 5–17)
B BURGDOR AB SER QL IA: 0.12 IV — SIGNIFICANT CHANGE UP
B MICROTI IGG TITR SER: SIGNIFICANT CHANGE UP
BUN SERPL-MCNC: 16 MG/DL — SIGNIFICANT CHANGE UP (ref 7–23)
BUN SERPL-MCNC: 18 MG/DL — SIGNIFICANT CHANGE UP (ref 7–23)
CALCIUM SERPL-MCNC: 9.2 MG/DL — SIGNIFICANT CHANGE UP (ref 8.4–10.5)
CALCIUM SERPL-MCNC: 9.4 MG/DL — SIGNIFICANT CHANGE UP (ref 8.4–10.5)
CHLORIDE SERPL-SCNC: 105 MMOL/L — SIGNIFICANT CHANGE UP (ref 96–108)
CHLORIDE SERPL-SCNC: 109 MMOL/L — HIGH (ref 96–108)
CO2 SERPL-SCNC: 19 MMOL/L — LOW (ref 22–31)
CO2 SERPL-SCNC: 21 MMOL/L — LOW (ref 22–31)
CREAT SERPL-MCNC: 0.9 MG/DL — SIGNIFICANT CHANGE UP (ref 0.5–1.3)
CREAT SERPL-MCNC: 0.92 MG/DL — SIGNIFICANT CHANGE UP (ref 0.5–1.3)
CULTURE RESULTS: SIGNIFICANT CHANGE UP
E CHAFFEENSIS IGG TITR SER IF: SIGNIFICANT CHANGE UP
EGFR: 101 ML/MIN/1.73M2 — SIGNIFICANT CHANGE UP
EGFR: 99 ML/MIN/1.73M2 — SIGNIFICANT CHANGE UP
GLUCOSE SERPL-MCNC: 102 MG/DL — HIGH (ref 70–99)
GLUCOSE SERPL-MCNC: 105 MG/DL — HIGH (ref 70–99)
HCT VFR BLD CALC: 45.9 % — SIGNIFICANT CHANGE UP (ref 39–50)
HGB BLD-MCNC: 15.6 G/DL — SIGNIFICANT CHANGE UP (ref 13–17)
MAGNESIUM SERPL-MCNC: 2.3 MG/DL — SIGNIFICANT CHANGE UP (ref 1.6–2.6)
MAGNESIUM SERPL-MCNC: 2.3 MG/DL — SIGNIFICANT CHANGE UP (ref 1.6–2.6)
MCHC RBC-ENTMCNC: 30 PG — SIGNIFICANT CHANGE UP (ref 27–34)
MCHC RBC-ENTMCNC: 34 GM/DL — SIGNIFICANT CHANGE UP (ref 32–36)
MCV RBC AUTO: 88.3 FL — SIGNIFICANT CHANGE UP (ref 80–100)
NRBC # BLD: 0 /100 WBCS — SIGNIFICANT CHANGE UP (ref 0–0)
PHOSPHATE SERPL-MCNC: 3.2 MG/DL — SIGNIFICANT CHANGE UP (ref 2.5–4.5)
PLATELET # BLD AUTO: 181 K/UL — SIGNIFICANT CHANGE UP (ref 150–400)
POTASSIUM SERPL-MCNC: 4.1 MMOL/L — SIGNIFICANT CHANGE UP (ref 3.5–5.3)
POTASSIUM SERPL-MCNC: 4.4 MMOL/L — SIGNIFICANT CHANGE UP (ref 3.5–5.3)
POTASSIUM SERPL-SCNC: 4.1 MMOL/L — SIGNIFICANT CHANGE UP (ref 3.5–5.3)
POTASSIUM SERPL-SCNC: 4.4 MMOL/L — SIGNIFICANT CHANGE UP (ref 3.5–5.3)
PROT SERPL-MCNC: 6.1 G/DL — SIGNIFICANT CHANGE UP (ref 6–8.3)
RBC # BLD: 5.2 M/UL — SIGNIFICANT CHANGE UP (ref 4.2–5.8)
RBC # FLD: 14.6 % — HIGH (ref 10.3–14.5)
SODIUM SERPL-SCNC: 138 MMOL/L — SIGNIFICANT CHANGE UP (ref 135–145)
SODIUM SERPL-SCNC: 140 MMOL/L — SIGNIFICANT CHANGE UP (ref 135–145)
SPECIMEN SOURCE: SIGNIFICANT CHANGE UP
WBC # BLD: 10 K/UL — SIGNIFICANT CHANGE UP (ref 3.8–10.5)
WBC # FLD AUTO: 10 K/UL — SIGNIFICANT CHANGE UP (ref 3.8–10.5)

## 2024-04-28 RX ORDER — SODIUM CHLORIDE 9 MG/ML
1000 INJECTION INTRAMUSCULAR; INTRAVENOUS; SUBCUTANEOUS
Refills: 0 | Status: DISCONTINUED | OUTPATIENT
Start: 2024-04-28 | End: 2024-05-01

## 2024-04-28 RX ADMIN — GABAPENTIN 600 MILLIGRAM(S): 400 CAPSULE ORAL at 14:54

## 2024-04-28 RX ADMIN — CALAMINE AND ZINC OXIDE AND PHENOL 1 APPLICATION(S): 160; 10 LOTION TOPICAL at 05:27

## 2024-04-28 RX ADMIN — CYPROHEPTADINE HYDROCHLORIDE 4 MILLIGRAM(S): 4 TABLET ORAL at 17:46

## 2024-04-28 RX ADMIN — MONTELUKAST 10 MILLIGRAM(S): 4 TABLET, CHEWABLE ORAL at 12:23

## 2024-04-28 RX ADMIN — BUDESONIDE AND FORMOTEROL FUMARATE DIHYDRATE 2 PUFF(S): 160; 4.5 AEROSOL RESPIRATORY (INHALATION) at 05:27

## 2024-04-28 RX ADMIN — HEPARIN SODIUM 5000 UNIT(S): 5000 INJECTION INTRAVENOUS; SUBCUTANEOUS at 05:27

## 2024-04-28 RX ADMIN — HEPARIN SODIUM 5000 UNIT(S): 5000 INJECTION INTRAVENOUS; SUBCUTANEOUS at 17:45

## 2024-04-28 RX ADMIN — Medication 1 APPLICATION(S): at 05:27

## 2024-04-28 RX ADMIN — GABAPENTIN 600 MILLIGRAM(S): 400 CAPSULE ORAL at 05:26

## 2024-04-28 RX ADMIN — CALAMINE AND ZINC OXIDE AND PHENOL 1 APPLICATION(S): 160; 10 LOTION TOPICAL at 17:49

## 2024-04-28 RX ADMIN — CYPROHEPTADINE HYDROCHLORIDE 4 MILLIGRAM(S): 4 TABLET ORAL at 05:27

## 2024-04-28 RX ADMIN — Medication 1 APPLICATION(S): at 17:50

## 2024-04-28 RX ADMIN — MIRABEGRON 25 MILLIGRAM(S): 50 TABLET, EXTENDED RELEASE ORAL at 21:34

## 2024-04-28 RX ADMIN — BUDESONIDE AND FORMOTEROL FUMARATE DIHYDRATE 2 PUFF(S): 160; 4.5 AEROSOL RESPIRATORY (INHALATION) at 17:49

## 2024-04-28 RX ADMIN — GABAPENTIN 600 MILLIGRAM(S): 400 CAPSULE ORAL at 21:34

## 2024-04-28 NOTE — PROVIDER CONTACT NOTE (OTHER) - SITUATION
Monitor tech notified primary RN that pt had a Ventricular standstill for 3.5 seconds for the first time.
pt had bradycardia 37, 38 on tele
Patient heart rate in the 30s sustaining
CHB 32-40's on tele monitor, 32 is the lowest since admission. pt asymptomatic.
pt had bradycardia 35  on tele
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
## 2024-04-28 NOTE — PROVIDER CONTACT NOTE (OTHER) - REASON
CORTES 32-40's on tele monitor
bradycardia 35 on tele
bradycardia 37 on tele
Patient heart rate in the 30s sustaining
Ventricular standstill 3.5 seconds

## 2024-04-28 NOTE — PROGRESS NOTE ADULT - SUBJECTIVE AND OBJECTIVE BOX
INTERVAL HPI/OVERNIGHT EVENTS:  Pt seen and examined at bedside.     Allergies/Intolerance: No Known Allergies      MEDICATIONS  (STANDING):  budesonide 160 MICROgram(s)/formoterol 4.5 MICROgram(s) Inhaler 2 Puff(s) Inhalation two times a day  calamine/zinc oxide Lotion 1 Application(s) Topical two times a day  cyproheptadine 4 milliGRAM(s) Oral two times a day  gabapentin 600 milliGRAM(s) Oral three times a day  heparin   Injectable 5000 Unit(s) SubCutaneous every 12 hours  mirabegron ER 25 milliGRAM(s) Oral daily  montelukast 10 milliGRAM(s) Oral daily  nystatin/triamcinolone Ointment 1 Application(s) Topical two times a day  polyethylene glycol 3350 17 Gram(s) Oral daily  senna 2 Tablet(s) Oral at bedtime    MEDICATIONS  (PRN):  acetaminophen     Tablet .. 650 milliGRAM(s) Oral every 6 hours PRN Temp greater or equal to 38C (100.4F), Mild Pain (1 - 3)        ROS: all systems reviewed and wnl      PHYSICAL EXAMINATION:  Vital Signs Last 24 Hrs  T(C): 36.6 (28 Apr 2024 11:49), Max: 36.9 (28 Apr 2024 00:16)  T(F): 97.8 (28 Apr 2024 11:49), Max: 98.4 (28 Apr 2024 00:16)  HR: 34 (28 Apr 2024 11:49) (32 - 37)  BP: 119/62 (28 Apr 2024 11:49) (98/58 - 122/68)  BP(mean): --  RR: 18 (28 Apr 2024 11:49) (18 - 18)  SpO2: 94% (28 Apr 2024 11:49) (93% - 96%)    Parameters below as of 28 Apr 2024 11:49  Patient On (Oxygen Delivery Method): room air      CAPILLARY BLOOD GLUCOSE          04-27 @ 07:01  -  04-28 @ 07:00  --------------------------------------------------------  IN: 220 mL / OUT: 0 mL / NET: 220 mL        GENERAL: stable, comfortable in bed, no CP or SOB  NECK: supple, No JVD  CHEST/LUNG: clear to auscultation bilaterally; no rales, rhonchi, or wheezing b/l  HEART: normal S1, S2  ABDOMEN: BS+, soft, ND, NT   EXTREMITIES:  pulses palpable; no clubbing, cyanosis, or edema b/l LEs    LABS:                        15.6   10.00 )-----------( 181      ( 28 Apr 2024 08:54 )             45.9     04-28    138  |  105  |  16  ----------------------------<  102<H>  4.1   |  21<L>  |  0.90    Ca    9.2      28 Apr 2024 08:54  Phos  3.2     04-28  Mg     2.3     04-28        Urinalysis Basic - ( 28 Apr 2024 08:54 )    Color: x / Appearance: x / SG: x / pH: x  Gluc: 102 mg/dL / Ketone: x  / Bili: x / Urobili: x   Blood: x / Protein: x / Nitrite: x   Leuk Esterase: x / RBC: x / WBC x   Sq Epi: x / Non Sq Epi: x / Bacteria: x

## 2024-04-28 NOTE — CHART NOTE - NSCHARTNOTEFT_GEN_A_CORE
Medicine NP Episodic Note    HPI/Interval Hx: 53M w/ PMHx of Asthma, CBP, transferred from [facility] for CHB (HR 30-40's)    Event Summary: Notified by RN, pt. w/ episode of 3.5 sec ventricular standstill on tele during sleep.  Pt. seen and evaluated at bedside, A+Ox3, in NAD.  Pt. denies c/o CP, SOB, palpitations, dizziness/lightheadedness, or any other significant complaints.    VSS, exam benign    Vital Signs Last 24 Hrs  T(C): 36.7 (28 Apr 2024 04:14), Max: 36.9 (28 Apr 2024 00:16)  T(F): 98.1 (28 Apr 2024 04:14), Max: 98.4 (28 Apr 2024 00:16)  HR: 33 (28 Apr 2024 04:14) (32 - 37)  BP: 107/65 (28 Apr 2024 04:14) (98/58 - 122/68)  BP(mean): --  RR: 18 (28 Apr 2024 04:14) (18 - 18)  SpO2: 94% (28 Apr 2024 04:14) (93% - 96%)    Parameters below as of 28 Apr 2024 04:14  Patient On (Oxygen Delivery Method): room air      Labs:                          15.3   13.10 )-----------( 174      ( 26 Apr 2024 07:09 )             47.3     04-28    140  |  109<H>  |  18  ----------------------------<  105<H>  4.4   |  19<L>  |  0.92    Ca    9.4      28 Apr 2024 00:47  Phos  3.2     04-28  Mg     2.3     04-28    # Ventricular Standstill iso CHB   > Stat BMP, Mg, Phos - WNL  > Case discussed w/ Cardiology fellow, no role for CICU or TVP at this time; closely monitor  > Continue tele   > Monitor vital signs   > EP/Cardiology to follow in am     Will endorse to primary team in am.  Attending to follow.    Selma Coley, [facility]  [phone]

## 2024-04-28 NOTE — PROVIDER CONTACT NOTE (OTHER) - BACKGROUND
Patient admitted for complete heart block. PMH of asthma
admitted for complete heart block with HR 40s
Admitting Dx: Complete Heart Block
admitted for CHB   plan of care ongoing. pending PET scan on Monday.
admitted for complete heart block with HR 40s

## 2024-04-28 NOTE — PROVIDER CONTACT NOTE (OTHER) - ASSESSMENT
pt A&O x4, denies cp/sob, pt without any complaints overnight. pt remains hemodynamically stable. CHB on tele monitor HR in 3-40 on tele monitor. pt asymptomatic. /64, hr 38, pox 96% on RA, T 98.7F
asymptomatic
Pt A&Ox4, denies chest pain, SOB, palpitations, dizziness or any other discomfort. At baseline rhythm Third degree AV block HR 30-40s. VSS.
Pt asymptomatic and denies cp or sob. BP 99/56 Hr 32.  Pt is anxious and concerned due to the event on tele.
asymptomatic

## 2024-04-28 NOTE — PROGRESS NOTE ADULT - SUBJECTIVE AND OBJECTIVE BOX
date of service: 04-28-24 @ 12:53  afebriel  REVIEW OF SYSTEMS:  CONSTITUTIONAL: No fever,  no  weight loss  ENT:  No  tinnitus,   no   vertigo  NECK: No pain or stiffness  RESPIRATORY: No cough, wheezing, chills or hemoptysis;    No Shortness of Breath  CARDIOVASCULAR: No chest pain, palpitations, dizziness  GASTROINTESTINAL: No abdominal or epigastric pain. No nausea, vomiting, or hematemesis; No diarrhea  No melena or hematochezia.  GENITOURINARY: No dysuria, frequency, hematuria, or incontinence  NEUROLOGICAL: No headaches  SKIN: No itching,  no   rash  LYMPH Nodes: No enlarged glands  ENDOCRINE: No heat or cold intolerance  MUSCULOSKELETAL: No joint pain or swelling  PSYCHIATRIC: No depression, anxiety  HEME/LYMPH: No easy bruising, or bleeding gums  ALLERGY AND IMMUNOLOGIC: No hives or eczema	    MEDICATIONS  (STANDING):  budesonide 160 MICROgram(s)/formoterol 4.5 MICROgram(s) Inhaler 2 Puff(s) Inhalation two times a day  calamine/zinc oxide Lotion 1 Application(s) Topical two times a day  cyproheptadine 4 milliGRAM(s) Oral two times a day  gabapentin 600 milliGRAM(s) Oral three times a day  heparin   Injectable 5000 Unit(s) SubCutaneous every 12 hours  mirabegron ER 25 milliGRAM(s) Oral daily  montelukast 10 milliGRAM(s) Oral daily  nystatin/triamcinolone Ointment 1 Application(s) Topical two times a day  polyethylene glycol 3350 17 Gram(s) Oral daily  senna 2 Tablet(s) Oral at bedtime    MEDICATIONS  (PRN):  acetaminophen     Tablet .. 650 milliGRAM(s) Oral every 6 hours PRN Temp greater or equal to 38C (100.4F), Mild Pain (1 - 3)      Vital Signs Last 24 Hrs  T(C): 36.6 (28 Apr 2024 11:49), Max: 36.9 (28 Apr 2024 00:16)  T(F): 97.8 (28 Apr 2024 11:49), Max: 98.4 (28 Apr 2024 00:16)  HR: 34 (28 Apr 2024 11:49) (32 - 37)  BP: 119/62 (28 Apr 2024 11:49) (98/58 - 122/68)  BP(mean): --  RR: 18 (28 Apr 2024 11:49) (18 - 18)  SpO2: 94% (28 Apr 2024 11:49) (93% - 96%)    Parameters below as of 28 Apr 2024 11:49  Patient On (Oxygen Delivery Method): room air      CAPILLARY BLOOD GLUCOSE        I&O's Summary    27 Apr 2024 07:01  -  28 Apr 2024 07:00  --------------------------------------------------------  IN: 220 mL / OUT: 0 mL / NET: 220 mL          Appearance: Normal	  HEENT:   Normal oral mucosa, PERRL, EOMI	  Lymphatic: No lymphadenopathy  Cardiovascular: Normal S1 S2, No JVD  Respiratory: Lungs clear to auscultation	  Gastrointestinal:  Soft, Non-tender, + BS	  Skin: No rash, No ecchymoses	  Extremities:     LABS:                        15.6   10.00 )-----------( 181      ( 28 Apr 2024 08:54 )             45.9     04-28    138  |  105  |  16  ----------------------------<  102<H>  4.1   |  21<L>  |  0.90    Ca    9.2      28 Apr 2024 08:54  Phos  3.2     04-28  Mg     2.3     04-28            Urinalysis Basic - ( 28 Apr 2024 08:54 )    Color: x / Appearance: x / SG: x / pH: x  Gluc: 102 mg/dL / Ketone: x  / Bili: x / Urobili: x   Blood: x / Protein: x / Nitrite: x   Leuk Esterase: x / RBC: x / WBC x   Sq Epi: x / Non Sq Epi: x / Bacteria: x              Thyroid Stimulating Hormone, Serum: 4.39 uIU/mL (04-23 @ 09:08)          Consultant(s) Notes Reviewed:      Care Discussed with Consultants/Other Providers:

## 2024-04-28 NOTE — PROGRESS NOTE ADULT - ASSESSMENT
53M     w/ PMH of asthma         presenting with CHB w/ RBBB escape     hr  in 30's        elevated  wbc,. on iv   ceftriaxone     wbc  noted. pt  was on oral  steroids /  for  dermatitis , that   comes  and  goes. its    c/c /  ha s  old  excoriations  on legs   ,and  also  s/p  epidural  injection, about 2  weeks ago per .  per  pt    echo,  normal  ef/ Ct  c/  normal    hr  in 30's .  awaiting  ppm/  amd   awiating  pet scan

## 2024-04-28 NOTE — PROGRESS NOTE ADULT - SUBJECTIVE AND OBJECTIVE BOX
Date of Service: 04-28-24 @ 10:17           CARDIOLOGY     PROGRESS  NOTE   ________________________________________________    CHIEF COMPLAINT:Patient is a 53y old  Male who presents with a chief complaint of CHB (27 Apr 2024 16:30)  no complain, still in chb  	  REVIEW OF SYSTEMS:  CONSTITUTIONAL: No fever, weight loss, or fatigue  EYES: No eye pain, visual disturbances, or discharge  ENT:  No difficulty hearing, tinnitus, vertigo; No sinus or throat pain  NECK: No pain or stiffness  RESPIRATORY: No cough, wheezing, chills or hemoptysis; No Shortness of Breath  CARDIOVASCULAR: No chest pain, palpitations, passing out, dizziness, or leg swelling  GASTROINTESTINAL: No abdominal or epigastric pain. No nausea, vomiting, or hematemesis; No diarrhea or constipation. No melena or hematochezia.  GENITOURINARY: No dysuria, frequency, hematuria, or incontinence  NEUROLOGICAL: No headaches, memory loss, loss of strength, numbness, or tremors  SKIN: No itching, burning, rashes, or lesions   LYMPH Nodes: No enlarged glands  ENDOCRINE: No heat or cold intolerance; No hair loss  MUSCULOSKELETAL: No joint pain or swelling; No muscle, back, or extremity pain  PSYCHIATRIC: No depression, anxiety, mood swings, or difficulty sleeping  HEME/LYMPH: No easy bruising, or bleeding gums  ALLERGY AND IMMUNOLOGIC: No hives or eczema	    [x ] All others negative	  [ ] Unable to obtain    PHYSICAL EXAM:  T(C): 36.7 (04-28-24 @ 04:14), Max: 36.9 (04-28-24 @ 00:16)  HR: 33 (04-28-24 @ 04:14) (32 - 37)  BP: 107/65 (04-28-24 @ 04:14) (98/58 - 122/68)  RR: 18 (04-28-24 @ 04:14) (18 - 18)  SpO2: 94% (04-28-24 @ 04:14) (93% - 96%)  Wt(kg): --  I&O's Summary    27 Apr 2024 07:01  -  28 Apr 2024 07:00  --------------------------------------------------------  IN: 220 mL / OUT: 0 mL / NET: 220 mL        Appearance: Normal	  HEENT:   Normal oral mucosa, PERRL, EOMI	  Lymphatic: No lymphadenopathy  Cardiovascular: Normal S1 S2, No JVD, + murmurs, No edema  Respiratory: Lungs clear to auscultation	  Psychiatry: A & O x 3, Mood & affect appropriate  Gastrointestinal:  Soft, Non-tender, + BS	  Skin: No rashes, No ecchymoses, No cyanosis	  Neurologic: Non-focal  Extremities: Normal range of motion, No clubbing, cyanosis or edema  Vascular: Peripheral pulses palpable 2+ bilaterally    MEDICATIONS  (STANDING):  budesonide 160 MICROgram(s)/formoterol 4.5 MICROgram(s) Inhaler 2 Puff(s) Inhalation two times a day  calamine/zinc oxide Lotion 1 Application(s) Topical two times a day  cyproheptadine 4 milliGRAM(s) Oral two times a day  gabapentin 600 milliGRAM(s) Oral three times a day  heparin   Injectable 5000 Unit(s) SubCutaneous every 12 hours  mirabegron ER 25 milliGRAM(s) Oral daily  montelukast 10 milliGRAM(s) Oral daily  nystatin/triamcinolone Ointment 1 Application(s) Topical two times a day  polyethylene glycol 3350 17 Gram(s) Oral daily  senna 2 Tablet(s) Oral at bedtime      TELEMETRY: 	    ECG:  	  RADIOLOGY:  OTHER: 	  	  LABS:	 	    CARDIAC MARKERS:                                15.6   10.00 )-----------( 181      ( 28 Apr 2024 08:54 )             45.9     04-28    138  |  105  |  16  ----------------------------<  102<H>  4.1   |  21<L>  |  0.90    Ca    9.2      28 Apr 2024 08:54  Phos  3.2     04-28  Mg     2.3     04-28      proBNP:   Lipid Profile:   HgA1c:   TSH: Thyroid Stimulating Hormone, Serum: 4.39 uIU/mL (04-23 @ 09:08)  Thyroid Stimulating Hormone, Serum: 4.13 uIU/mL (04-22 @ 18:50)        Assessment and plan  ---------------------------  Pt is a 54yo M w/ PMH of asthma, chronic back pain pw intermittent chest discomfort and SOB.  Reports ~1 week's time of SOB and dizziness a/w intermittent chest discomfort for which he saw his PMD/cardiologist and underwent ECG notable for RBBB. Given his recent travel to [place] about a month ago he was advised to f/u w/ CTA PE protocol outpt to r/o PE given his symptoms which he has not gotten yet. On day of presentation he was at friend's home to and became very SOB and felt unwell when walking in and thus presented to [place] where he was noted to be in CHB and thus tx here to [facility].   He otherwise endorses having the flu about a month ago s/p tamiflu and a recent rash which he describes as blotches on his upper and lower extremities for which he saw a dermatologist and underwent 2 tapers of steroids which helped. He otherwise denies recent hiking though does report bike riding in the park, last in October of last year. Not on AV darcy blockade   pt with heart block ?etiology  check trop  agree with  lyme titer, negative  awaiting echo, noted  esr/ crp  tsh normal  ct , no lymphoadenopathy, echo no regional wall motion abnormality lyme titer negative ?pet scan if can be done as inpatient  doubt reversible cause, awaiting PET scan   check HIV test  awaiting PET scan on Sunday  start on dvt prophylaxis  still in CHB, asymptomatic, check ACE level  events noted, remaind in CHB, awaiting PET ?today

## 2024-04-28 NOTE — CHART NOTE - NSCHARTNOTEFT_GEN_A_CORE
Called to assess patient for 3.5sec ventricular standstill in setting of known CHB. Patient reports asymptomatic during event, denies dizziness/lightheadedness, CP, SOB. BP stable. Discussed with on-call EP attending Dr. Mcfadden, no role for CICU or TVP at this time. Continue to monitor on telemetry.     Jaky Garcia MD  PGY-4, Cardiology  Available on TEAMS    For all new consults  [email or web address]  Login: [email or web address]

## 2024-04-28 NOTE — PROVIDER CONTACT NOTE (OTHER) - ACTION/TREATMENT ORDERED:
ACP made aware. Defibrillator at the bedside. Pt pending PET scan and PPM insertion. No change of plan of care at this time.
no orders made
no orders made
Stat labs drawn and sent.  Repeat manual /58.
no new orders.

## 2024-04-29 ENCOUNTER — APPOINTMENT (OUTPATIENT)
Dept: NUCLEAR MEDICINE | Facility: IMAGING CENTER | Age: 54
End: 2024-04-29
Payer: COMMERCIAL

## 2024-04-29 ENCOUNTER — OUTPATIENT (OUTPATIENT)
Dept: OUTPATIENT SERVICES | Facility: HOSPITAL | Age: 54
LOS: 1 days | End: 2024-04-29
Payer: COMMERCIAL

## 2024-04-29 DIAGNOSIS — Z00.8 ENCOUNTER FOR OTHER GENERAL EXAMINATION: ICD-10-CM

## 2024-04-29 DIAGNOSIS — Z90.49 ACQUIRED ABSENCE OF OTHER SPECIFIED PARTS OF DIGESTIVE TRACT: Chronic | ICD-10-CM

## 2024-04-29 LAB
ACE SERPL-CCNC: 44 U/L — SIGNIFICANT CHANGE UP (ref 14–82)
ACE SERPL-CCNC: 50 U/L — SIGNIFICANT CHANGE UP (ref 14–82)
ANION GAP SERPL CALC-SCNC: 14 MMOL/L — SIGNIFICANT CHANGE UP (ref 5–17)
BUN SERPL-MCNC: 19 MG/DL — SIGNIFICANT CHANGE UP (ref 7–23)
CALCIUM SERPL-MCNC: 9.4 MG/DL — SIGNIFICANT CHANGE UP (ref 8.4–10.5)
CHLORIDE SERPL-SCNC: 109 MMOL/L — HIGH (ref 96–108)
CO2 SERPL-SCNC: 18 MMOL/L — LOW (ref 22–31)
CREAT SERPL-MCNC: 0.99 MG/DL — SIGNIFICANT CHANGE UP (ref 0.5–1.3)
CREATININE, URINE RESULT: 78 MG/DL — SIGNIFICANT CHANGE UP
EGFR: 91 ML/MIN/1.73M2 — SIGNIFICANT CHANGE UP
GLUCOSE SERPL-MCNC: 84 MG/DL — SIGNIFICANT CHANGE UP (ref 70–99)
POTASSIUM SERPL-MCNC: 4.3 MMOL/L — SIGNIFICANT CHANGE UP (ref 3.5–5.3)
POTASSIUM SERPL-SCNC: 4.3 MMOL/L — SIGNIFICANT CHANGE UP (ref 3.5–5.3)
PROT ?TM UR-MCNC: 5 MG/DL — SIGNIFICANT CHANGE UP (ref 0–12)
SODIUM SERPL-SCNC: 141 MMOL/L — SIGNIFICANT CHANGE UP (ref 135–145)

## 2024-04-29 PROCEDURE — 78815 PET IMAGE W/CT SKULL-THIGH: CPT | Mod: 26

## 2024-04-29 PROCEDURE — 78451 HT MUSCLE IMAGE SPECT SING: CPT | Mod: 26

## 2024-04-29 PROCEDURE — 78429 MYOCRD IMG PET 1 STD W/CT: CPT | Mod: 26

## 2024-04-29 RX ADMIN — GABAPENTIN 600 MILLIGRAM(S): 400 CAPSULE ORAL at 21:53

## 2024-04-29 RX ADMIN — CYPROHEPTADINE HYDROCHLORIDE 4 MILLIGRAM(S): 4 TABLET ORAL at 17:05

## 2024-04-29 RX ADMIN — GABAPENTIN 600 MILLIGRAM(S): 400 CAPSULE ORAL at 17:04

## 2024-04-29 RX ADMIN — MIRABEGRON 25 MILLIGRAM(S): 50 TABLET, EXTENDED RELEASE ORAL at 21:51

## 2024-04-29 RX ADMIN — Medication 1 APPLICATION(S): at 06:25

## 2024-04-29 RX ADMIN — CALAMINE AND ZINC OXIDE AND PHENOL 1 APPLICATION(S): 160; 10 LOTION TOPICAL at 06:24

## 2024-04-29 RX ADMIN — BUDESONIDE AND FORMOTEROL FUMARATE DIHYDRATE 2 PUFF(S): 160; 4.5 AEROSOL RESPIRATORY (INHALATION) at 06:24

## 2024-04-29 RX ADMIN — BUDESONIDE AND FORMOTEROL FUMARATE DIHYDRATE 2 PUFF(S): 160; 4.5 AEROSOL RESPIRATORY (INHALATION) at 17:16

## 2024-04-29 RX ADMIN — SODIUM CHLORIDE 100 MILLILITER(S): 9 INJECTION INTRAMUSCULAR; INTRAVENOUS; SUBCUTANEOUS at 01:24

## 2024-04-29 RX ADMIN — Medication 1 APPLICATION(S): at 17:04

## 2024-04-29 RX ADMIN — HEPARIN SODIUM 5000 UNIT(S): 5000 INJECTION INTRAVENOUS; SUBCUTANEOUS at 17:04

## 2024-04-29 RX ADMIN — CALAMINE AND ZINC OXIDE AND PHENOL 1 APPLICATION(S): 160; 10 LOTION TOPICAL at 17:04

## 2024-04-29 NOTE — PROGRESS NOTE ADULT - SUBJECTIVE AND OBJECTIVE BOX
INTERVAL HPI/OVERNIGHT EVENTS:  Pt seen and examined at bedside.     Allergies/Intolerance: No Known Allergies      MEDICATIONS  (STANDING):  budesonide 160 MICROgram(s)/formoterol 4.5 MICROgram(s) Inhaler 2 Puff(s) Inhalation two times a day  calamine/zinc oxide Lotion 1 Application(s) Topical two times a day  cyproheptadine 4 milliGRAM(s) Oral two times a day  gabapentin 600 milliGRAM(s) Oral three times a day  heparin   Injectable 5000 Unit(s) SubCutaneous every 12 hours  mirabegron ER 25 milliGRAM(s) Oral daily  montelukast 10 milliGRAM(s) Oral daily  nystatin/triamcinolone Ointment 1 Application(s) Topical two times a day  polyethylene glycol 3350 17 Gram(s) Oral daily  senna 2 Tablet(s) Oral at bedtime  sodium chloride 0.9%. 1000 milliLiter(s) (100 mL/Hr) IV Continuous <Continuous>    MEDICATIONS  (PRN):  acetaminophen     Tablet .. 650 milliGRAM(s) Oral every 6 hours PRN Temp greater or equal to 38C (100.4F), Mild Pain (1 - 3)        ROS: all systems reviewed and wnl      PHYSICAL EXAMINATION:  Vital Signs Last 24 Hrs  T(C): 36.6 (29 Apr 2024 04:58), Max: 36.7 (29 Apr 2024 01:21)  T(F): 97.9 (29 Apr 2024 04:58), Max: 98 (29 Apr 2024 01:21)  HR: 32 (29 Apr 2024 04:58) (31 - 35)  BP: 109/58 (29 Apr 2024 04:58) (109/58 - 126/64)  BP(mean): --  RR: 18 (29 Apr 2024 04:58) (18 - 18)  SpO2: 93% (29 Apr 2024 04:58) (93% - 94%)    Parameters below as of 29 Apr 2024 04:58  Patient On (Oxygen Delivery Method): room air      CAPILLARY BLOOD GLUCOSE          04-28 @ 07:01  -  04-29 @ 07:00  --------------------------------------------------------  IN: 700 mL / OUT: 0 mL / NET: 700 mL        GENERAL: stable, comfortable over night, no SOB or syncope   NECK: supple, No JVD  CHEST/LUNG: clear to auscultation bilaterally; no rales, rhonchi, or wheezing b/l  HEART: normal S1, S2  ABDOMEN: BS+, soft, ND, NT   EXTREMITIES:  pulses palpable; no clubbing, cyanosis, or edema b/l LEs    LABS:                        15.6   10.00 )-----------( 181      ( 28 Apr 2024 08:54 )             45.9     04-29    141  |  109<H>  |  19  ----------------------------<  84  4.3   |  18<L>  |  0.99    Ca    9.4      29 Apr 2024 07:27  Phos  3.2     04-28  Mg     2.3     04-28        Urinalysis Basic - ( 29 Apr 2024 07:27 )    Color: x / Appearance: x / SG: x / pH: x  Gluc: 84 mg/dL / Ketone: x  / Bili: x / Urobili: x   Blood: x / Protein: x / Nitrite: x   Leuk Esterase: x / RBC: x / WBC x   Sq Epi: x / Non Sq Epi: x / Bacteria: x

## 2024-04-29 NOTE — PROGRESS NOTE ADULT - SUBJECTIVE AND OBJECTIVE BOX
Date of Service: 04-29-24 @ 09:03           CARDIOLOGY     PROGRESS  NOTE   ________________________________________________    CHIEF COMPLAINT:Patient is a 53y old  Male who presents with a chief complaint of CHB (28 Apr 2024 12:52)  no complain  	  REVIEW OF SYSTEMS:  CONSTITUTIONAL: No fever, weight loss, or fatigue  EYES: No eye pain, visual disturbances, or discharge  ENT:  No difficulty hearing, tinnitus, vertigo; No sinus or throat pain  NECK: No pain or stiffness  RESPIRATORY: No cough, wheezing, chills or hemoptysis; No Shortness of Breath  CARDIOVASCULAR: No chest pain, palpitations, passing out, dizziness, or leg swelling  GASTROINTESTINAL: No abdominal or epigastric pain. No nausea, vomiting, or hematemesis; No diarrhea or constipation. No melena or hematochezia.  GENITOURINARY: No dysuria, frequency, hematuria, or incontinence  NEUROLOGICAL: No headaches, memory loss, loss of strength, numbness, or tremors  SKIN: No itching, burning, rashes, or lesions   LYMPH Nodes: No enlarged glands  ENDOCRINE: No heat or cold intolerance; No hair loss  MUSCULOSKELETAL: No joint pain or swelling; No muscle, back, or extremity pain  PSYCHIATRIC: No depression, anxiety, mood swings, or difficulty sleeping  HEME/LYMPH: No easy bruising, or bleeding gums  ALLERGY AND IMMUNOLOGIC: No hives or eczema	    [ ] All others negative	  [ ] Unable to obtain    PHYSICAL EXAM:  T(C): 36.6 (04-29-24 @ 04:58), Max: 36.7 (04-29-24 @ 01:21)  HR: 32 (04-29-24 @ 04:58) (31 - 35)  BP: 109/58 (04-29-24 @ 04:58) (109/58 - 126/64)  RR: 18 (04-29-24 @ 04:58) (18 - 18)  SpO2: 93% (04-29-24 @ 04:58) (93% - 94%)  Wt(kg): --  I&O's Summary    28 Apr 2024 07:01  -  29 Apr 2024 07:00  --------------------------------------------------------  IN: 700 mL / OUT: 0 mL / NET: 700 mL        Appearance: Normal	  HEENT:   Normal oral mucosa, PERRL, EOMI	  Lymphatic: No lymphadenopathy  Cardiovascular: Normal S1 S2, No JVD, No murmurs, No edema  Respiratory: Lungs clear to auscultation	  Psychiatry: A & O x 3, Mood & affect appropriate  Gastrointestinal:  Soft, Non-tender, + BS	  Skin: No rashes, No ecchymoses, No cyanosis	  Neurologic: Non-focal  Extremities: Normal range of motion, No clubbing, cyanosis or edema  Vascular: Peripheral pulses palpable 2+ bilaterally    MEDICATIONS  (STANDING):  budesonide 160 MICROgram(s)/formoterol 4.5 MICROgram(s) Inhaler 2 Puff(s) Inhalation two times a day  calamine/zinc oxide Lotion 1 Application(s) Topical two times a day  cyproheptadine 4 milliGRAM(s) Oral two times a day  gabapentin 600 milliGRAM(s) Oral three times a day  heparin   Injectable 5000 Unit(s) SubCutaneous every 12 hours  mirabegron ER 25 milliGRAM(s) Oral daily  montelukast 10 milliGRAM(s) Oral daily  nystatin/triamcinolone Ointment 1 Application(s) Topical two times a day  polyethylene glycol 3350 17 Gram(s) Oral daily  senna 2 Tablet(s) Oral at bedtime  sodium chloride 0.9%. 1000 milliLiter(s) (100 mL/Hr) IV Continuous <Continuous>      TELEMETRY: 	    ECG:  	  RADIOLOGY:  OTHER: 	  	  LABS:	 	    CARDIAC MARKERS:                                15.6   10.00 )-----------( 181      ( 28 Apr 2024 08:54 )             45.9     04-29    141  |  109<H>  |  19  ----------------------------<  84  4.3   |  18<L>  |  0.99    Ca    9.4      29 Apr 2024 07:27  Phos  3.2     04-28  Mg     2.3     04-28      proBNP:   Lipid Profile:   HgA1c:   TSH: Thyroid Stimulating Hormone, Serum: 4.39 uIU/mL (04-23 @ 09:08)  Thyroid Stimulating Hormone, Serum: 4.13 uIU/mL (04-22 @ 18:50)        Assessment and plan  ---------------------------  Pt is a 52yo M w/ PMH of asthma, chronic back pain pw intermittent chest discomfort and SOB.  Reports ~1 week's time of SOB and dizziness a/w intermittent chest discomfort for which he saw his PMD/cardiologist and underwent ECG notable for RBBB. Given his recent travel to [place] about a month ago he was advised to f/u w/ CTA PE protocol outpt to r/o PE given his symptoms which he has not gotten yet. On day of presentation he was at friend's home to and became very SOB and felt unwell when walking in and thus presented to [place] where he was noted to be in CHB and thus tx here to [facility].   He otherwise endorses having the flu about a month ago s/p tamiflu and a recent rash which he describes as blotches on his upper and lower extremities for which he saw a dermatologist and underwent 2 tapers of steroids which helped. He otherwise denies recent hiking though does report bike riding in the park, last in October of last year. Not on AV darcy blockade   pt with heart block ?etiology  check trop  agree with  lyme titer, negative  awaiting echo, noted  esr/ crp  tsh normal  ct , no lymphoadenopathy, echo no regional wall motion abnormality lyme titer negative ?pet scan if can be done as inpatient  doubt reversible cause, awaiting PET scan   check HIV test  awaiting PET scan on Sunday  start on dvt prophylaxis  still in CHB, asymptomatic, check ACE level  events noted, remained in CHB, awaiting PET   continue to be in chb plan as per Dr Frost

## 2024-04-29 NOTE — PROGRESS NOTE ADULT - ASSESSMENT
52 yo man with history of obesity, asthma, and recent influenza infection s/p oseltamivir admitted with symptomatic bradycardia found to be in complete heart block with a RBBB escape.     #complete heart block   -unclear etiology  -electrolytes wnl, thyroid studies with subclinical hypothyroidis, no evidence of adrenal insufficiency, ACE level wnl, structurally wnl heart on tTE  - s/p PET/CT today; awaiting official read  - NPO for device implantation tomorrow; likely PPM unless PET/CT with evidence of sarcoid then would undergo ICD implantation  - Close telemetry monitoring      Rickie Weller MD  EP Fellow
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
## 2024-04-29 NOTE — PROGRESS NOTE ADULT - SUBJECTIVE AND OBJECTIVE BOX
Patient seen and examined at bedside.    Overnight Events:  no events overnight. feels well.    Review Of Systems: No chest pain, shortness of breath, or palpitations            Current Meds:  acetaminophen     Tablet .. 650 milliGRAM(s) Oral every 6 hours PRN  budesonide 160 MICROgram(s)/formoterol 4.5 MICROgram(s) Inhaler 2 Puff(s) Inhalation two times a day  calamine/zinc oxide Lotion 1 Application(s) Topical two times a day  cyproheptadine 4 milliGRAM(s) Oral two times a day  gabapentin 600 milliGRAM(s) Oral three times a day  heparin   Injectable 5000 Unit(s) SubCutaneous every 12 hours  mirabegron ER 25 milliGRAM(s) Oral daily  montelukast 10 milliGRAM(s) Oral daily  nystatin/triamcinolone Ointment 1 Application(s) Topical two times a day  polyethylene glycol 3350 17 Gram(s) Oral daily  senna 2 Tablet(s) Oral at bedtime  sodium chloride 0.9%. 1000 milliLiter(s) IV Continuous <Continuous>      Vitals:  T(F): 98.8 (04-29), Max: 98.8 (04-29)  HR: 32 (04-29) (31 - 35)  BP: 120/65 (04-29) (109/58 - 126/64)  RR: 18 (04-29)  SpO2: 93% (04-29)  I&O's Summary    28 Apr 2024 07:01  -  29 Apr 2024 07:00  --------------------------------------------------------  IN: 700 mL / OUT: 0 mL / NET: 700 mL        Physical Exam:  Appearance: No acute distress; well appearing  Eyes: PERRL, EOMI, pink conjunctiva  HEENT: Normal oral mucosa  Cardiovascular: bradycardic, S1, S2, no murmurs, rubs, or gallops; no edema; no JVD  Respiratory: Clear to auscultation bilaterally  Gastrointestinal: soft, non-tender, non-distended with normal bowel sounds  Musculoskeletal: No clubbing; no joint deformity   Neurologic: Non-focal  Lymphatic: No lymphadenopathy  Psychiatry: AAOx3, mood & affect appropriate  Skin: No rashes, ecchymoses, or cyanosis                          15.6   10.00 )-----------( 181      ( 28 Apr 2024 08:54 )             45.9     04-29    141  |  109<H>  |  19  ----------------------------<  84  4.3   |  18<L>  |  0.99    Ca    9.4      29 Apr 2024 07:27  Phos  3.2     04-28  Mg     2.3     04-28        CARDIAC MARKERS ( 22 Apr 2024 20:29 )  17 ng/L / x     / x     / 95 U/L / x     / 1.6 ng/mL  CARDIAC MARKERS ( 22 Apr 2024 18:50 )  x     / x     / x     / 81 U/L / x     / <1.0 ng/mL            Interpretation of Telemetry: complete heart block escape 30 BPM

## 2024-04-30 LAB
ANION GAP SERPL CALC-SCNC: 13 MMOL/L — SIGNIFICANT CHANGE UP (ref 5–17)
BLD GP AB SCN SERPL QL: NEGATIVE — SIGNIFICANT CHANGE UP
BUN SERPL-MCNC: 18 MG/DL — SIGNIFICANT CHANGE UP (ref 7–23)
CALCIUM SERPL-MCNC: 9 MG/DL — SIGNIFICANT CHANGE UP (ref 8.4–10.5)
CHLORIDE SERPL-SCNC: 107 MMOL/L — SIGNIFICANT CHANGE UP (ref 96–108)
CO2 SERPL-SCNC: 18 MMOL/L — LOW (ref 22–31)
CREAT SERPL-MCNC: 0.93 MG/DL — SIGNIFICANT CHANGE UP (ref 0.5–1.3)
EGFR: 98 ML/MIN/1.73M2 — SIGNIFICANT CHANGE UP
GLUCOSE SERPL-MCNC: 96 MG/DL — SIGNIFICANT CHANGE UP (ref 70–99)
KAPPA LC SER QL IFE: 0.92 MG/DL — SIGNIFICANT CHANGE UP (ref 0.33–1.94)
KAPPA/LAMBDA FREE LIGHT CHAIN RATIO, SERUM: 0.97 RATIO — SIGNIFICANT CHANGE UP (ref 0.26–1.65)
LAMBDA LC SER QL IFE: 0.95 MG/DL — SIGNIFICANT CHANGE UP (ref 0.57–2.63)
MAGNESIUM SERPL-MCNC: 2.2 MG/DL — SIGNIFICANT CHANGE UP (ref 1.6–2.6)
POTASSIUM SERPL-MCNC: 4 MMOL/L — SIGNIFICANT CHANGE UP (ref 3.5–5.3)
POTASSIUM SERPL-SCNC: 4 MMOL/L — SIGNIFICANT CHANGE UP (ref 3.5–5.3)
RH IG SCN BLD-IMP: POSITIVE — SIGNIFICANT CHANGE UP
RH IG SCN BLD-IMP: POSITIVE — SIGNIFICANT CHANGE UP
SODIUM SERPL-SCNC: 138 MMOL/L — SIGNIFICANT CHANGE UP (ref 135–145)

## 2024-04-30 PROCEDURE — 33208 INSRT HEART PM ATRIAL & VENT: CPT

## 2024-04-30 PROCEDURE — A9552: CPT

## 2024-04-30 PROCEDURE — 78815 PET IMAGE W/CT SKULL-THIGH: CPT

## 2024-04-30 PROCEDURE — 93010 ELECTROCARDIOGRAM REPORT: CPT

## 2024-04-30 PROCEDURE — A9500: CPT

## 2024-04-30 PROCEDURE — 78451 HT MUSCLE IMAGE SPECT SING: CPT

## 2024-04-30 PROCEDURE — 78429 MYOCRD IMG PET 1 STD W/CT: CPT

## 2024-04-30 RX ORDER — OXYCODONE HYDROCHLORIDE 5 MG/1
5 TABLET ORAL ONCE
Refills: 0 | Status: DISCONTINUED | OUTPATIENT
Start: 2024-05-01 | End: 2024-05-01

## 2024-04-30 RX ORDER — OXYCODONE HYDROCHLORIDE 5 MG/1
2.5 TABLET ORAL ONCE
Refills: 0 | Status: DISCONTINUED | OUTPATIENT
Start: 2024-04-30 | End: 2024-04-30

## 2024-04-30 RX ORDER — CEFAZOLIN SODIUM 1 G
1000 VIAL (EA) INJECTION EVERY 8 HOURS
Refills: 0 | Status: COMPLETED | OUTPATIENT
Start: 2024-04-30 | End: 2024-05-01

## 2024-04-30 RX ORDER — OXYCODONE HYDROCHLORIDE 5 MG/1
10 TABLET ORAL ONCE
Refills: 0 | Status: DISCONTINUED | OUTPATIENT
Start: 2024-05-01 | End: 2024-05-01

## 2024-04-30 RX ADMIN — SODIUM CHLORIDE 75 MILLILITER(S): 9 INJECTION INTRAMUSCULAR; INTRAVENOUS; SUBCUTANEOUS at 00:02

## 2024-04-30 RX ADMIN — GABAPENTIN 600 MILLIGRAM(S): 400 CAPSULE ORAL at 06:02

## 2024-04-30 RX ADMIN — CYPROHEPTADINE HYDROCHLORIDE 4 MILLIGRAM(S): 4 TABLET ORAL at 06:02

## 2024-04-30 RX ADMIN — CALAMINE AND ZINC OXIDE AND PHENOL 1 APPLICATION(S): 160; 10 LOTION TOPICAL at 05:29

## 2024-04-30 RX ADMIN — OXYCODONE HYDROCHLORIDE 2.5 MILLIGRAM(S): 5 TABLET ORAL at 22:10

## 2024-04-30 RX ADMIN — BUDESONIDE AND FORMOTEROL FUMARATE DIHYDRATE 2 PUFF(S): 160; 4.5 AEROSOL RESPIRATORY (INHALATION) at 05:28

## 2024-04-30 RX ADMIN — Medication 650 MILLIGRAM(S): at 18:28

## 2024-04-30 RX ADMIN — Medication 1 APPLICATION(S): at 05:29

## 2024-04-30 RX ADMIN — Medication 100 MILLIGRAM(S): at 23:21

## 2024-04-30 RX ADMIN — GABAPENTIN 600 MILLIGRAM(S): 400 CAPSULE ORAL at 21:28

## 2024-04-30 RX ADMIN — MONTELUKAST 10 MILLIGRAM(S): 4 TABLET, CHEWABLE ORAL at 11:04

## 2024-04-30 RX ADMIN — OXYCODONE HYDROCHLORIDE 2.5 MILLIGRAM(S): 5 TABLET ORAL at 21:28

## 2024-04-30 RX ADMIN — MIRABEGRON 25 MILLIGRAM(S): 50 TABLET, EXTENDED RELEASE ORAL at 21:29

## 2024-04-30 NOTE — PROGRESS NOTE ADULT - SUBJECTIVE AND OBJECTIVE BOX
INTERVAL HPI/OVERNIGHT EVENTS:  still itchy, thinks he is getting better. friend brought him a Cerave anti-itch cream he is using    MEDICATIONS  (STANDING):  budesonide 160 MICROgram(s)/formoterol 4.5 MICROgram(s) Inhaler 2 Puff(s) Inhalation two times a day  calamine/zinc oxide Lotion 1 Application(s) Topical two times a day  cyproheptadine 4 milliGRAM(s) Oral two times a day  gabapentin 600 milliGRAM(s) Oral three times a day  heparin   Injectable 5000 Unit(s) SubCutaneous every 12 hours  mirabegron ER 25 milliGRAM(s) Oral daily  montelukast 10 milliGRAM(s) Oral daily  nystatin/triamcinolone Ointment 1 Application(s) Topical two times a day  polyethylene glycol 3350 17 Gram(s) Oral daily  senna 2 Tablet(s) Oral at bedtime  sodium chloride 0.9%. 1000 milliLiter(s) (75 mL/Hr) IV Continuous <Continuous>    MEDICATIONS  (PRN):  acetaminophen     Tablet .. 650 milliGRAM(s) Oral every 6 hours PRN Temp greater or equal to 38C (100.4F), Mild Pain (1 - 3)      Allergies    No Known Allergies    Intolerances        Vital Signs Last 24 Hrs  T(C): 36.9 (30 Apr 2024 15:14), Max: 37.1 (29 Apr 2024 15:51)  T(F): 98.5 (30 Apr 2024 12:22), Max: 98.8 (29 Apr 2024 15:51)  HR: 33 (30 Apr 2024 15:14) (32 - 39)  BP: 114/62 (30 Apr 2024 15:14) (109/58 - 121/58)  BP(mean): 80 (30 Apr 2024 15:14) (80 - 80)  RR: 18 (30 Apr 2024 15:14) (18 - 18)  SpO2: 94% (30 Apr 2024 15:14) (92% - 96%)    Parameters below as of 30 Apr 2024 12:22  Patient On (Oxygen Delivery Method): room air          PHYSICAL EXAM:    The patient was  in NAD.  OP showed no ulcerations.  There was no visible LAD.  Conjunctiva were non-injected.  There was no clubbing or edema of extremities.  The scalp, hair, face, eyebrows, lips, OP, neck, chest, back, extremities x4, and nails were examined.  There was no hyperhidrosis or bromhidrosis.    Of note on skin exam: previously seen edematous erythematous patches are resolving, linear excoriations remain      LABS:    04-30    138  |  107  |  18  ----------------------------<  96  4.0   |  18<L>  |  0.93    Ca    9.0      30 Apr 2024 06:53  Mg     2.2     04-30        Urinalysis Basic - ( 30 Apr 2024 06:53 )    Color: x / Appearance: x / SG: x / pH: x  Gluc: 96 mg/dL / Ketone: x  / Bili: x / Urobili: x   Blood: x / Protein: x / Nitrite: x   Leuk Esterase: x / RBC: x / WBC x   Sq Epi: x / Non Sq Epi: x / Bacteria: x        RADIOLOGY & ADDITIONAL TESTS: reviewed; no pertinent findings

## 2024-04-30 NOTE — CHART NOTE - NSCHARTNOTESELECT_GEN_ALL_CORE
Cardiology/Event Note
Event Note
PET/Event Note
preliminary read PET/CT/Event Note

## 2024-04-30 NOTE — PROGRESS NOTE ADULT - SUBJECTIVE AND OBJECTIVE BOX
INTERVAL HPI/OVERNIGHT EVENTS:  Pt seen and examined at bedside.     Allergies/Intolerance: No Known Allergies      MEDICATIONS  (STANDING):  budesonide 160 MICROgram(s)/formoterol 4.5 MICROgram(s) Inhaler 2 Puff(s) Inhalation two times a day  calamine/zinc oxide Lotion 1 Application(s) Topical two times a day  cyproheptadine 4 milliGRAM(s) Oral two times a day  gabapentin 600 milliGRAM(s) Oral three times a day  heparin   Injectable 5000 Unit(s) SubCutaneous every 12 hours  mirabegron ER 25 milliGRAM(s) Oral daily  montelukast 10 milliGRAM(s) Oral daily  nystatin/triamcinolone Ointment 1 Application(s) Topical two times a day  polyethylene glycol 3350 17 Gram(s) Oral daily  senna 2 Tablet(s) Oral at bedtime  sodium chloride 0.9%. 1000 milliLiter(s) (75 mL/Hr) IV Continuous <Continuous>    MEDICATIONS  (PRN):  acetaminophen     Tablet .. 650 milliGRAM(s) Oral every 6 hours PRN Temp greater or equal to 38C (100.4F), Mild Pain (1 - 3)        ROS: all systems reviewed and wnl      PHYSICAL EXAMINATION:  Vital Signs Last 24 Hrs  T(C): 36.7 (30 Apr 2024 05:06), Max: 37.1 (29 Apr 2024 15:51)  T(F): 98 (30 Apr 2024 05:06), Max: 98.8 (29 Apr 2024 15:51)  HR: 32 (30 Apr 2024 05:06) (32 - 39)  BP: 109/58 (30 Apr 2024 05:06) (109/58 - 121/58)  BP(mean): --  RR: 18 (30 Apr 2024 05:06) (18 - 18)  SpO2: 92% (30 Apr 2024 05:06) (92% - 96%)    Parameters below as of 30 Apr 2024 05:06  Patient On (Oxygen Delivery Method): room air      CAPILLARY BLOOD GLUCOSE            GENERAL: stable, comfortable in bed past few days, no syncope, CP or SOB.   NECK: supple, No JVD  CHEST/LUNG: clear to auscultation bilaterally; no rales, rhonchi, or wheezing b/l  HEART: normal S1, S2  ABDOMEN: BS+, soft, ND, NT   EXTREMITIES:  pulses palpable; no clubbing, cyanosis, or edema b/l LEs    LABS:                        15.6   10.00 )-----------( 181      ( 28 Apr 2024 08:54 )             45.9     04-30    138  |  107  |  18  ----------------------------<  96  4.0   |  18<L>  |  0.93    Ca    9.0      30 Apr 2024 06:53  Mg     2.2     04-30        Urinalysis Basic - ( 30 Apr 2024 06:53 )    Color: x / Appearance: x / SG: x / pH: x  Gluc: 96 mg/dL / Ketone: x  / Bili: x / Urobili: x   Blood: x / Protein: x / Nitrite: x   Leuk Esterase: x / RBC: x / WBC x   Sq Epi: x / Non Sq Epi: x / Bacteria: x

## 2024-04-30 NOTE — PROGRESS NOTE ADULT - SUBJECTIVE AND OBJECTIVE BOX
Patient seen and examined at bedside.    Overnight Events:  No events overnight. Nervous about otherwise no other complaints.    Review Of Systems: No chest pain, shortness of breath, or palpitations            Current Meds:  acetaminophen     Tablet .. 650 milliGRAM(s) Oral every 6 hours PRN  budesonide 160 MICROgram(s)/formoterol 4.5 MICROgram(s) Inhaler 2 Puff(s) Inhalation two times a day  calamine/zinc oxide Lotion 1 Application(s) Topical two times a day  cyproheptadine 4 milliGRAM(s) Oral two times a day  gabapentin 600 milliGRAM(s) Oral three times a day  heparin   Injectable 5000 Unit(s) SubCutaneous every 12 hours  mirabegron ER 25 milliGRAM(s) Oral daily  montelukast 10 milliGRAM(s) Oral daily  nystatin/triamcinolone Ointment 1 Application(s) Topical two times a day  polyethylene glycol 3350 17 Gram(s) Oral daily  senna 2 Tablet(s) Oral at bedtime  sodium chloride 0.9%. 1000 milliLiter(s) IV Continuous <Continuous>      Vitals:  T(F): 98.5 (04-30), Max: 98.8 (04-29)  HR: 33 (04-30) (32 - 39)  BP: 114/62 (04-30) (109/58 - 121/58)  RR: 18 (04-30)  SpO2: 94% (04-30)  I&O's Summary      Physical Exam:  Appearance: No acute distress; well appearing  Eyes: PERRL, EOMI, pink conjunctiva  HEENT: Normal oral mucosa  Cardiovascular: bradycardic, S1, S2, no murmurs, rubs, or gallops; no edema; no JVD  Respiratory: Clear to auscultation bilaterally  Gastrointestinal: soft, non-tender, non-distended with normal bowel sounds  Musculoskeletal: No clubbing; no joint deformity   Neurologic: Non-focal  Lymphatic: No lymphadenopathy  Psychiatry: AAOx3, mood & affect appropriate  Skin: No rashes, ecchymoses, or cyanosis      04-30    138  |  107  |  18  ----------------------------<  96  4.0   |  18<L>  |  0.93    Ca    9.0      30 Apr 2024 06:53  Mg     2.2     04-30            Interpretation of Telemetry: complete heart block, escape 30s
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
## 2024-04-30 NOTE — PROGRESS NOTE ADULT - ASSESSMENT
54 yo man with history of obesity, asthma, and recent influenza infection s/p oseltamivir admitted with symptomatic bradycardia found to be in complete heart block with a RBBB escape.     #complete heart block   -unclear etiology  - electrolytes wnl, thyroid studies with subclinical hypothyroidism, ESR/CRP wnl, lyme neg,  no evidence of adrenal insufficiency, ACE level wnl, structurally wnl heart on tTE  - s/p PET/CT today; awaiting official read however prelim read without evidence of cardiac sarcoid  - for PPM today    Rickie Weller MD  EP Fellow

## 2024-04-30 NOTE — PROGRESS NOTE ADULT - ATTENDING COMMENTS
CHB awaiting FDG PET for r/o sarcoid.
Ongoing CHB. CT scan negative for LAD. Plan for likely FDG-PET prior to PPM to r/o myocarditis prior to PPM.
Heart block. Awaiting PET-CT next week.
for PPM

## 2024-04-30 NOTE — PROGRESS NOTE ADULT - ASSESSMENT
ASSESSMENT AND PLAN:  A/P:  NOTE IS INCOMPLETE. PLEASE CHECK BACK LATER FOR FINAL ASSESSMENT AND RECOMMENDATIONS.    dermatitis, improving; likely atopic vs follicular eczema  - morphology not consistent with scabies or sarcoidosis   - some areas without primary morphology, considering pruritus without rash differential such as thyroid disease vs MDS vs MM; review of systems negative  - labs reviewed, CBC normal, TSH high, but T4 wnl, calcium wnl  - CONT triamcinolone 0.1% ointment BID to affected areas (never to the face/groin/skin folds) for up to 2 weeks on, then 1 week off. Please dispense multiple tubes to cover body surface area.  - previously reached out to Dr. Arriola office for records, never got response    Patient can follow up with us in the [facility] located at [address] [place] [address] [address] [address] upon discharge. Please instruct patient to call our office. Office phone number is [phone].    The patient's chart was reviewed in addition to being seen and examined at bedside with the dermatology attending Dr. Wright.  Recommendations were communicated with the primary team.    Please page [phone] with a 10-digit call-back number for further related questions.  Please re-consult Dermatology for any new or worsening developments.    Zelalem Felix MD  Resident Physician, PGY-2  [facility]  Pager: [phone]  Office: [phone]

## 2024-04-30 NOTE — PROGRESS NOTE ADULT - SUBJECTIVE AND OBJECTIVE BOX
Date of Service: 04-30-24 @ 07:42           CARDIOLOGY     PROGRESS  NOTE   ________________________________________________    CHIEF COMPLAINT:Patient is a 53y old  Male who presents with a chief complaint of CHB (29 Apr 2024 16:49)  no complain  	  REVIEW OF SYSTEMS:  CONSTITUTIONAL: No fever, weight loss, or fatigue  EYES: No eye pain, visual disturbances, or discharge  ENT:  No difficulty hearing, tinnitus, vertigo; No sinus or throat pain  NECK: No pain or stiffness  RESPIRATORY: No cough, wheezing, chills or hemoptysis; No Shortness of Breath  CARDIOVASCULAR: No chest pain, palpitations, passing out, dizziness, or leg swelling  GASTROINTESTINAL: No abdominal or epigastric pain. No nausea, vomiting, or hematemesis; No diarrhea or constipation. No melena or hematochezia.  GENITOURINARY: No dysuria, frequency, hematuria, or incontinence  NEUROLOGICAL: No headaches, memory loss, loss of strength, numbness, or tremors  SKIN: No itching, burning, rashes, or lesions   LYMPH Nodes: No enlarged glands  ENDOCRINE: No heat or cold intolerance; No hair loss  MUSCULOSKELETAL: No joint pain or swelling; No muscle, back, or extremity pain  PSYCHIATRIC: No depression, anxiety, mood swings, or difficulty sleeping  HEME/LYMPH: No easy bruising, or bleeding gums  ALLERGY AND IMMUNOLOGIC: No hives or eczema	    [x ] All others negative	  [ ] Unable to obtain    PHYSICAL EXAM:  T(C): 36.7 (04-30-24 @ 05:06), Max: 37.1 (04-29-24 @ 15:51)  HR: 32 (04-30-24 @ 05:06) (32 - 39)  BP: 109/58 (04-30-24 @ 05:06) (109/58 - 121/58)  RR: 18 (04-30-24 @ 05:06) (18 - 18)  SpO2: 92% (04-30-24 @ 05:06) (92% - 96%)  Wt(kg): --  I&O's Summary      Appearance: Normal	  HEENT:   Normal oral mucosa, PERRL, EOMI	  Lymphatic: No lymphadenopathy  Cardiovascular: Normal S1 S2, No JVD, No murmurs, No edema  Respiratory: Lungs clear to auscultation	  Psychiatry: A & O x 3, Mood & affect appropriate  Gastrointestinal:  Soft, Non-tender, + BS	  Skin: No rashes, No ecchymoses, No cyanosis	  Neurologic: Non-focal  Extremities: Normal range of motion, No clubbing, cyanosis or edema  Vascular: Peripheral pulses palpable 2+ bilaterally    MEDICATIONS  (STANDING):  budesonide 160 MICROgram(s)/formoterol 4.5 MICROgram(s) Inhaler 2 Puff(s) Inhalation two times a day  calamine/zinc oxide Lotion 1 Application(s) Topical two times a day  cyproheptadine 4 milliGRAM(s) Oral two times a day  gabapentin 600 milliGRAM(s) Oral three times a day  heparin   Injectable 5000 Unit(s) SubCutaneous every 12 hours  mirabegron ER 25 milliGRAM(s) Oral daily  montelukast 10 milliGRAM(s) Oral daily  nystatin/triamcinolone Ointment 1 Application(s) Topical two times a day  polyethylene glycol 3350 17 Gram(s) Oral daily  senna 2 Tablet(s) Oral at bedtime  sodium chloride 0.9%. 1000 milliLiter(s) (75 mL/Hr) IV Continuous <Continuous>      TELEMETRY: 	    ECG:  	  RADIOLOGY:  OTHER: 	  	  LABS:	 	    CARDIAC MARKERS:                                15.6   10.00 )-----------( 181      ( 28 Apr 2024 08:54 )             45.9     04-29    141  |  109<H>  |  19  ----------------------------<  84  4.3   |  18<L>  |  0.99    Ca    9.4      29 Apr 2024 07:27  Mg     2.3     04-28      proBNP:   Lipid Profile:   HgA1c:   TSH: Thyroid Stimulating Hormone, Serum: 4.39 uIU/mL (04-23 @ 09:08)  Thyroid Stimulating Hormone, Serum: 4.13 uIU/mL (04-22 @ 18:50)          Assessment and plan  ---------------------------  Pt is a 52yo M w/ PMH of asthma, chronic back pain pw intermittent chest discomfort and SOB.  Reports ~1 week's time of SOB and dizziness a/w intermittent chest discomfort for which he saw his PMD/cardiologist and underwent ECG notable for RBBB. Given his recent travel to [place] about a month ago he was advised to f/u w/ CTA PE protocol outpt to r/o PE given his symptoms which he has not gotten yet. On day of presentation he was at friend's home to and became very SOB and felt unwell when walking in and thus presented to [place] where he was noted to be in CHB and thus tx here to [facility].   He otherwise endorses having the flu about a month ago s/p tamiflu and a recent rash which he describes as blotches on his upper and lower extremities for which he saw a dermatologist and underwent 2 tapers of steroids which helped. He otherwise denies recent hiking though does report bike riding in the park, last in October of last year. Not on AV darcy blockade   pt with heart block ?etiology  check trop  agree with  lyme titer, negative  awaiting echo, noted  esr/ crp  tsh normal  ct , no lymphoadenopathy, echo no regional wall motion abnormality lyme titer negative ?pet scan if can be done as inpatient  doubt reversible cause, awaiting PET scan   check HIV test  awaiting PET scan on Sunday  start on dvt prophylaxis  still in CHB, asymptomatic, check ACE level  events noted, remained in CHB, awaiting PET   continue to be in chb plan as per Dr Frost  ppm today if PET negative

## 2024-04-30 NOTE — PRE-ANESTHESIA EVALUATION ADULT - NSANTHOSAYNRD_GEN_A_CORE
No. VALDO screening performed.  STOP BANG Legend: 0-2 = LOW Risk; 3-4 = INTERMEDIATE Risk; 5-8 = HIGH Risk

## 2024-04-30 NOTE — CHART NOTE - NSCHARTNOTEFT_GEN_A_CORE
Per discussion with Dr. Chelle Nicole, there is no evidence of cardiac sarcoidosis or myocardial disease noted on resting imaging on a preliminary read of the PET/CT.  Full read to follow.    Rickie Weller MD  EP Fellow  For all New Consults and Questions:  [email or web address]   Login: [email or web address]

## 2024-05-01 ENCOUNTER — TRANSCRIPTION ENCOUNTER (OUTPATIENT)
Age: 54
End: 2024-05-01

## 2024-05-01 VITALS
TEMPERATURE: 98 F | HEART RATE: 85 BPM | RESPIRATION RATE: 18 BRPM | OXYGEN SATURATION: 96 % | SYSTOLIC BLOOD PRESSURE: 118 MMHG | DIASTOLIC BLOOD PRESSURE: 78 MMHG

## 2024-05-01 LAB
% ALBUMIN: 62.4 % — SIGNIFICANT CHANGE UP
% ALPHA 1: 4.6 % — SIGNIFICANT CHANGE UP
% ALPHA 2: 12.2 % — SIGNIFICANT CHANGE UP
% BETA: 12.2 % — SIGNIFICANT CHANGE UP
% GAMMA, URINE: 4.7 % — SIGNIFICANT CHANGE UP
% GAMMA: 8.6 % — SIGNIFICANT CHANGE UP
ALBUMIN 24H MFR UR ELPH: 20.2 % — SIGNIFICANT CHANGE UP
ALBUMIN SERPL ELPH-MCNC: 3.8 G/DL — SIGNIFICANT CHANGE UP (ref 3.6–5.5)
ALBUMIN/GLOB SERPL ELPH: 1.7 RATIO — SIGNIFICANT CHANGE UP
ALPHA1 GLOB 24H MFR UR ELPH: 41.6 % — SIGNIFICANT CHANGE UP
ALPHA1 GLOB SERPL ELPH-MCNC: 0.3 G/DL — SIGNIFICANT CHANGE UP (ref 0.1–0.4)
ALPHA2 GLOB 24H MFR UR ELPH: 22.4 % — SIGNIFICANT CHANGE UP
ALPHA2 GLOB SERPL ELPH-MCNC: 0.7 G/DL — SIGNIFICANT CHANGE UP (ref 0.5–1)
B-GLOBULIN 24H MFR UR ELPH: 11.1 % — SIGNIFICANT CHANGE UP
B-GLOBULIN SERPL ELPH-MCNC: 0.7 G/DL — SIGNIFICANT CHANGE UP (ref 0.5–1)
COLLECT DURATION TIME UR: 24 HR — SIGNIFICANT CHANGE UP
GAMMA GLOBULIN: 0.5 G/DL — LOW (ref 0.6–1.6)
INTERPRETATION 24H UR IFE-IMP: SIGNIFICANT CHANGE UP
INTERPRETATION SERPL IFE-IMP: SIGNIFICANT CHANGE UP
M PROTEIN 24H UR ELPH-MRATE: 0 % — SIGNIFICANT CHANGE UP
M PROTEIN 24H UR ELPH-MRATE: 0 MG/24HR — SIGNIFICANT CHANGE UP (ref 0–0)
M PROTEIN 24H UR ELPH-MRATE: 0 MG/DL — SIGNIFICANT CHANGE UP
PROT ?TM UR-MCNC: 5 MG/DL — SIGNIFICANT CHANGE UP (ref 0–12)
PROT PATTERN 24H UR ELPH-IMP: SIGNIFICANT CHANGE UP
PROT PATTERN SERPL ELPH-IMP: SIGNIFICANT CHANGE UP
PROT SERPL-MCNC: 6.1 G/DL — SIGNIFICANT CHANGE UP (ref 6–8.3)
PROTEIN QUANT CALC, URINE: 98 MG/24 H — SIGNIFICANT CHANGE UP (ref 50–100)
TOTAL VOLUME - 24 HOUR: 1950 ML — SIGNIFICANT CHANGE UP
URINE CREATININE CALCULATION: 1.5 G/24 H — SIGNIFICANT CHANGE UP (ref 1–2)

## 2024-05-01 PROCEDURE — 83605 ASSAY OF LACTIC ACID: CPT

## 2024-05-01 PROCEDURE — 82553 CREATINE MB FRACTION: CPT

## 2024-05-01 PROCEDURE — 84436 ASSAY OF TOTAL THYROXINE: CPT

## 2024-05-01 PROCEDURE — 99285 EMERGENCY DEPT VISIT HI MDM: CPT

## 2024-05-01 PROCEDURE — 86334 IMMUNOFIX E-PHORESIS SERUM: CPT

## 2024-05-01 PROCEDURE — 85025 COMPLETE CBC W/AUTO DIFF WBC: CPT

## 2024-05-01 PROCEDURE — 80048 BASIC METABOLIC PNL TOTAL CA: CPT

## 2024-05-01 PROCEDURE — 94640 AIRWAY INHALATION TREATMENT: CPT

## 2024-05-01 PROCEDURE — 82947 ASSAY GLUCOSE BLOOD QUANT: CPT

## 2024-05-01 PROCEDURE — 86140 C-REACTIVE PROTEIN: CPT

## 2024-05-01 PROCEDURE — 71045 X-RAY EXAM CHEST 1 VIEW: CPT

## 2024-05-01 PROCEDURE — 83690 ASSAY OF LIPASE: CPT

## 2024-05-01 PROCEDURE — 84443 ASSAY THYROID STIM HORMONE: CPT

## 2024-05-01 PROCEDURE — 87476 LYME DIS DNA AMP PROBE: CPT

## 2024-05-01 PROCEDURE — 82330 ASSAY OF CALCIUM: CPT

## 2024-05-01 PROCEDURE — 85652 RBC SED RATE AUTOMATED: CPT

## 2024-05-01 PROCEDURE — 87389 HIV-1 AG W/HIV-1&-2 AB AG IA: CPT

## 2024-05-01 PROCEDURE — 93005 ELECTROCARDIOGRAM TRACING: CPT

## 2024-05-01 PROCEDURE — 71250 CT THORAX DX C-: CPT | Mod: MC

## 2024-05-01 PROCEDURE — 85014 HEMATOCRIT: CPT

## 2024-05-01 PROCEDURE — 84479 ASSAY OF THYROID (T3 OR T4): CPT

## 2024-05-01 PROCEDURE — 86850 RBC ANTIBODY SCREEN: CPT

## 2024-05-01 PROCEDURE — 84155 ASSAY OF PROTEIN SERUM: CPT

## 2024-05-01 PROCEDURE — C1785: CPT

## 2024-05-01 PROCEDURE — 83880 ASSAY OF NATRIURETIC PEPTIDE: CPT

## 2024-05-01 PROCEDURE — 71045 X-RAY EXAM CHEST 1 VIEW: CPT | Mod: 26,59

## 2024-05-01 PROCEDURE — 85018 HEMOGLOBIN: CPT

## 2024-05-01 PROCEDURE — 71046 X-RAY EXAM CHEST 2 VIEWS: CPT

## 2024-05-01 PROCEDURE — 84100 ASSAY OF PHOSPHORUS: CPT

## 2024-05-01 PROCEDURE — 36415 COLL VENOUS BLD VENIPUNCTURE: CPT

## 2024-05-01 PROCEDURE — C1892: CPT

## 2024-05-01 PROCEDURE — 96374 THER/PROPH/DIAG INJ IV PUSH: CPT

## 2024-05-01 PROCEDURE — 86901 BLOOD TYPING SEROLOGIC RH(D): CPT

## 2024-05-01 PROCEDURE — 83521 IG LIGHT CHAINS FREE EACH: CPT

## 2024-05-01 PROCEDURE — 85027 COMPLETE CBC AUTOMATED: CPT

## 2024-05-01 PROCEDURE — 82550 ASSAY OF CK (CPK): CPT

## 2024-05-01 PROCEDURE — 84165 PROTEIN E-PHORESIS SERUM: CPT

## 2024-05-01 PROCEDURE — 80053 COMPREHEN METABOLIC PANEL: CPT

## 2024-05-01 PROCEDURE — 82164 ANGIOTENSIN I ENZYME TEST: CPT

## 2024-05-01 PROCEDURE — 86618 LYME DISEASE ANTIBODY: CPT

## 2024-05-01 PROCEDURE — 84132 ASSAY OF SERUM POTASSIUM: CPT

## 2024-05-01 PROCEDURE — 71046 X-RAY EXAM CHEST 2 VIEWS: CPT | Mod: 26

## 2024-05-01 PROCEDURE — 83735 ASSAY OF MAGNESIUM: CPT

## 2024-05-01 PROCEDURE — 93306 TTE W/DOPPLER COMPLETE: CPT

## 2024-05-01 PROCEDURE — 84295 ASSAY OF SERUM SODIUM: CPT

## 2024-05-01 PROCEDURE — 82803 BLOOD GASES ANY COMBINATION: CPT

## 2024-05-01 PROCEDURE — 84484 ASSAY OF TROPONIN QUANT: CPT

## 2024-05-01 PROCEDURE — C1769: CPT

## 2024-05-01 PROCEDURE — 84166 PROTEIN E-PHORESIS/URINE/CSF: CPT

## 2024-05-01 PROCEDURE — 86900 BLOOD TYPING SEROLOGIC ABO: CPT

## 2024-05-01 PROCEDURE — C1898: CPT

## 2024-05-01 PROCEDURE — 33208 INSRT HEART PM ATRIAL & VENT: CPT

## 2024-05-01 PROCEDURE — 76376 3D RENDER W/INTRP POSTPROCES: CPT

## 2024-05-01 PROCEDURE — 87040 BLOOD CULTURE FOR BACTERIA: CPT

## 2024-05-01 PROCEDURE — 82435 ASSAY OF BLOOD CHLORIDE: CPT

## 2024-05-01 RX ORDER — MIRABEGRON 50 MG/1
1 TABLET, EXTENDED RELEASE ORAL
Refills: 0 | DISCHARGE

## 2024-05-01 RX ORDER — POLYETHYLENE GLYCOL 3350 17 G/17G
17 POWDER, FOR SOLUTION ORAL
Qty: 0 | Refills: 0 | DISCHARGE
Start: 2024-05-01

## 2024-05-01 RX ORDER — HYDROXYZINE HCL 10 MG
25 TABLET ORAL DAILY
Refills: 0 | Status: DISCONTINUED | OUTPATIENT
Start: 2024-05-01 | End: 2024-05-01

## 2024-05-01 RX ORDER — MIRABEGRON 50 MG/1
1 TABLET, EXTENDED RELEASE ORAL
Qty: 30 | Refills: 0
Start: 2024-05-01 | End: 2024-05-30

## 2024-05-01 RX ORDER — OXYCODONE HYDROCHLORIDE 5 MG/1
2.5 TABLET ORAL ONCE
Refills: 0 | Status: DISCONTINUED | OUTPATIENT
Start: 2024-05-01 | End: 2024-05-01

## 2024-05-01 RX ORDER — ACETAMINOPHEN 500 MG
1 TABLET ORAL
Qty: 0 | Refills: 0 | DISCHARGE

## 2024-05-01 RX ORDER — PANTOPRAZOLE SODIUM 20 MG/1
1 TABLET, DELAYED RELEASE ORAL
Qty: 30 | Refills: 0
Start: 2024-05-01 | End: 2024-05-30

## 2024-05-01 RX ORDER — KETOROLAC TROMETHAMINE 30 MG/ML
15 SYRINGE (ML) INJECTION EVERY 8 HOURS
Refills: 0 | Status: DISCONTINUED | OUTPATIENT
Start: 2024-05-01 | End: 2024-05-01

## 2024-05-01 RX ORDER — SENNA PLUS 8.6 MG/1
2 TABLET ORAL
Qty: 0 | Refills: 0 | DISCHARGE
Start: 2024-05-01

## 2024-05-01 RX ORDER — OXYCODONE HYDROCHLORIDE 5 MG/1
1 TABLET ORAL
Qty: 8 | Refills: 0
Start: 2024-05-01 | End: 2024-05-02

## 2024-05-01 RX ORDER — TIZANIDINE 4 MG/1
2 TABLET ORAL
Refills: 0 | DISCHARGE

## 2024-05-01 RX ADMIN — HEPARIN SODIUM 5000 UNIT(S): 5000 INJECTION INTRAVENOUS; SUBCUTANEOUS at 06:56

## 2024-05-01 RX ADMIN — MONTELUKAST 10 MILLIGRAM(S): 4 TABLET, CHEWABLE ORAL at 12:08

## 2024-05-01 RX ADMIN — Medication 650 MILLIGRAM(S): at 00:53

## 2024-05-01 RX ADMIN — BUDESONIDE AND FORMOTEROL FUMARATE DIHYDRATE 2 PUFF(S): 160; 4.5 AEROSOL RESPIRATORY (INHALATION) at 06:55

## 2024-05-01 RX ADMIN — Medication 650 MILLIGRAM(S): at 07:49

## 2024-05-01 RX ADMIN — GABAPENTIN 600 MILLIGRAM(S): 400 CAPSULE ORAL at 06:55

## 2024-05-01 RX ADMIN — GABAPENTIN 600 MILLIGRAM(S): 400 CAPSULE ORAL at 13:16

## 2024-05-01 RX ADMIN — OXYCODONE HYDROCHLORIDE 2.5 MILLIGRAM(S): 5 TABLET ORAL at 07:18

## 2024-05-01 RX ADMIN — Medication 1 APPLICATION(S): at 06:56

## 2024-05-01 RX ADMIN — Medication 100 MILLIGRAM(S): at 07:19

## 2024-05-01 RX ADMIN — CYPROHEPTADINE HYDROCHLORIDE 4 MILLIGRAM(S): 4 TABLET ORAL at 07:10

## 2024-05-01 RX ADMIN — Medication 15 MILLIGRAM(S): at 10:55

## 2024-05-01 NOTE — DISCHARGE NOTE PROVIDER - CARE PROVIDER_API CALL
Galdino Samano  Cardiovascular Disease  [address] [place] [address]  Phone: [phone]  Fax: [phone]  Established Patient  Follow Up Time: 2 weeks    Megan Murillo  Cardiovascular Disease  [address] [place] [address] [address]  [place] [address]  Phone: [phone]  Fax: [phone]  Established Patient  Scheduled Appointment: 05/15/2024 03:00 PM

## 2024-05-01 NOTE — PROGRESS NOTE ADULT - ASSESSMENT
54 y/o M with history of obesity, asthma, and recent influenza infection s/p oseltamivir admitted with symptomatic bradycardia found to be in complete heart block with a RBBB escape. S/p PET w/o evidence of sarcoid, ESR/CRP/ACE WNL, Lyme negative. Structurally normal heart on TTE. He is now s/p dual chamber PPM.     1) CHB, unclear etiology   - S/p dual chamber PPM (Chang). Post procedure teaching performed, [facility] appointment given 5/3/24 8:40AM   - CXR PA/LAT and EKG reviewed, interrogation performed by [employer] rep this AM WNL   - HOLD HEPARIN AND LOVENOX SQ POST IMPLANT   - Patient c/o significant pain this AM, aggressive pain control w/ Oxy/APAP/Toradol   - Continue to monitor on tele while inpatient   - Replete lytes for K>4 and Mg>2

## 2024-05-01 NOTE — PROGRESS NOTE ADULT - PROVIDER SPECIALTY LIST ADULT
Cardiology
Hospitalist
Internal Medicine
Cardiology
Cardiology
Dermatology
Hospitalist
Hospitalist
Internal Medicine
Internal Medicine
Electrophysiology
Hospitalist
Electrophysiology
Hospitalist
Hospitalist
Internal Medicine

## 2024-05-01 NOTE — DISCHARGE NOTE PROVIDER - NSDCMRMEDTOKEN_GEN_ALL_CORE_FT
Traci TUCKER OTC 24HR 180 mg-240 mg oral tablet, extended release: 1 tab(s) orally once a day as needed for  allergy symptoms  FDG-PET CT: rule out sarcoidosis, myocarditis or other infiltrative/inflammatory process of the heart  gabapentin 600 mg oral tablet: 1 tab(s) orally 3 times a day  montelukast 10 mg oral tablet: 1 tab(s) orally once a day  Myrbetriq 50 mg oral tablet, extended release: 1 tab(s) orally once a day  Symbicort 160 mcg-4.5 mcg/inh inhalation aerosol: 2 puff(s) inhaled 2 times a day  tiZANidine 4 mg oral tablet: 2 tab(s) orally once a day (at bedtime)   acetaminophen 650 mg oral tablet: 1 tab(s) orally every 6 hours as needed for  severe pain  Allegra D OTC 24HR 180 mg-240 mg oral tablet, extended release: 1 tab(s) orally once a day as needed for  allergy symptoms  gabapentin 600 mg oral tablet: 1 tab(s) orally 3 times a day  mirabegron 25 mg oral tablet, extended release: 1 tab(s) orally once a day  montelukast 10 mg oral tablet: 1 tab(s) orally once a day  oxyCODONE 5 mg oral tablet: 1 tab(s) orally every 6 hours as needed for  severe pain Take one tab PO every 6 hours PRN severe pain MDD: 4 tabs  polyethylene glycol 3350 oral powder for reconstitution: 17 gram(s) orally once a day  Protonix 40 mg oral delayed release tablet: 1 tab(s) orally once a day  senna leaf extract oral tablet: 2 tab(s) orally once a day (at bedtime)  Symbicort 160 mcg-4.5 mcg/inh inhalation aerosol: 2 puff(s) inhaled 2 times a day

## 2024-05-01 NOTE — PROGRESS NOTE ADULT - ASSESSMENT
53M     w/ PMH of asthma         presenting with CHB w/ RBBB escape     hr  in 30's    no  h/o  tick bite     echo,  normal  ef/ Ct  c/  normal    tsh  noted,  no  rx  needed    lyme  titer ,  negative    s/p  ppm  yesterday/  mri  heart ,  no  sarcoid    plan,   d/c  home  today

## 2024-05-01 NOTE — DISCHARGE NOTE PROVIDER - NSDCCPCAREPLAN_GEN_ALL_CORE_FT
PRINCIPAL DISCHARGE DIAGNOSIS  Diagnosis: Complete heart block  Assessment and Plan of Treatment: Pacemaker placed on 4/30/24.  PetScan negative for signs of sarcoidosis.  Follow up with your PMD/cardiologist raquel one week of discharge.  Follow up with Electrophysiology for wound check and further monitoring.

## 2024-05-01 NOTE — PROGRESS NOTE ADULT - SUBJECTIVE AND OBJECTIVE BOX
date of service: 05-01-24 @ 07:49  afberile  REVIEW OF SYSTEMS:  CONSTITUTIONAL: No fever,  no  weight loss  ENT:  No  tinnitus,   no   vertigo  NECK: No pain or stiffness  RESPIRATORY: No cough, wheezing, chills or hemoptysis;    No Shortness of Breath  CARDIOVASCULAR: No chest pain, palpitations, dizziness  GASTROINTESTINAL: No abdominal or epigastric pain. No nausea, vomiting, or hematemesis; No diarrhea  No melena or hematochezia.  GENITOURINARY: No dysuria, frequency, hematuria, or incontinence  NEUROLOGICAL: No headaches  SKIN: No itching,  no   rash  LYMPH Nodes: No enlarged glands  ENDOCRINE: No heat or cold intolerance  MUSCULOSKELETAL: No joint pain or swelling  PSYCHIATRIC: No depression, anxiety  HEME/LYMPH: No easy bruising, or bleeding gums  ALLERGY AND IMMUNOLOGIC: No hives or eczema	    MEDICATIONS  (STANDING):  budesonide 160 MICROgram(s)/formoterol 4.5 MICROgram(s) Inhaler 2 Puff(s) Inhalation two times a day  calamine/zinc oxide Lotion 1 Application(s) Topical two times a day  cyproheptadine 4 milliGRAM(s) Oral two times a day  gabapentin 600 milliGRAM(s) Oral three times a day  heparin   Injectable 5000 Unit(s) SubCutaneous every 12 hours  mirabegron ER 25 milliGRAM(s) Oral daily  montelukast 10 milliGRAM(s) Oral daily  nystatin/triamcinolone Ointment 1 Application(s) Topical two times a day  polyethylene glycol 3350 17 Gram(s) Oral daily  senna 2 Tablet(s) Oral at bedtime  sodium chloride 0.9%. 1000 milliLiter(s) (75 mL/Hr) IV Continuous <Continuous>    MEDICATIONS  (PRN):  acetaminophen     Tablet .. 650 milliGRAM(s) Oral every 6 hours PRN Temp greater or equal to 38C (100.4F), Mild Pain (1 - 3)      Vital Signs Last 24 Hrs  T(C): 36.8 (01 May 2024 04:45), Max: 36.9 (30 Apr 2024 12:22)  T(F): 98.2 (01 May 2024 04:45), Max: 98.5 (30 Apr 2024 12:22)  HR: 74 (01 May 2024 06:41) (33 - 90)  BP: 108/69 (01 May 2024 04:45) (107/70 - 148/96)  BP(mean): 80 (30 Apr 2024 15:14) (80 - 80)  RR: 18 (01 May 2024 04:45) (18 - 18)  SpO2: 95% (01 May 2024 04:45) (94% - 99%)    Parameters below as of 01 May 2024 04:45  Patient On (Oxygen Delivery Method): room air      CAPILLARY BLOOD GLUCOSE        I&O's Summary        Appearance: Normal	  HEENT:   Normal oral mucosa, PERRL, EOMI	  Lymphatic: No lymphadenopathy  Cardiovascular: Normal S1 S2, No JVD  Respiratory: Lungs clear to auscultation	  Gastrointestinal:  Soft, Non-tender, + BS	  Skin: No rash, No ecchymoses	  Extremities:     LABS:    04-30    138  |  107  |  18  ----------------------------<  96  4.0   |  18<L>  |  0.93    Ca    9.0      30 Apr 2024 06:53  Mg     2.2     04-30            Urinalysis Basic - ( 30 Apr 2024 06:53 )    Color: x / Appearance: x / SG: x / pH: x  Gluc: 96 mg/dL / Ketone: x  / Bili: x / Urobili: x   Blood: x / Protein: x / Nitrite: x   Leuk Esterase: x / RBC: x / WBC x   Sq Epi: x / Non Sq Epi: x / Bacteria: x              Thyroid Stimulating Hormone, Serum: 4.39 uIU/mL (04-23 @ 09:08)          Consultant(s) Notes Reviewed:      Care Discussed with Consultants/Other Providers:

## 2024-05-01 NOTE — PROGRESS NOTE ADULT - SUBJECTIVE AND OBJECTIVE BOX
Date of Service: 05-01-24 @ 07:27           CARDIOLOGY     PROGRESS  NOTE   ________________________________________________    CHIEF COMPLAINT:Patient is a 53y old  Male who presents with a chief complaint of CHB (30 Apr 2024 14:14)  no complain  	  REVIEW OF SYSTEMS:  CONSTITUTIONAL: No fever, weight loss, or fatigue  EYES: No eye pain, visual disturbances, or discharge  ENT:  No difficulty hearing, tinnitus, vertigo; No sinus or throat pain  NECK: No pain or stiffness  RESPIRATORY: No cough, wheezing, chills or hemoptysis; No Shortness of Breath  CARDIOVASCULAR: No chest pain, palpitations, passing out, dizziness, or leg swelling  GASTROINTESTINAL: No abdominal or epigastric pain. No nausea, vomiting, or hematemesis; No diarrhea or constipation. No melena or hematochezia.  GENITOURINARY: No dysuria, frequency, hematuria, or incontinence  NEUROLOGICAL: No headaches, memory loss, loss of strength, numbness, or tremors  SKIN: No itching, burning, rashes, or lesions   LYMPH Nodes: No enlarged glands  ENDOCRINE: No heat or cold intolerance; No hair loss  MUSCULOSKELETAL: No joint pain or swelling; No muscle, back, or extremity pain  PSYCHIATRIC: No depression, anxiety, mood swings, or difficulty sleeping  HEME/LYMPH: No easy bruising, or bleeding gums  ALLERGY AND IMMUNOLOGIC: No hives or eczema	    [x ] All others negative	  [ ] Unable to obtain    PHYSICAL EXAM:  T(C): 36.8 (05-01-24 @ 04:45), Max: 36.9 (04-30-24 @ 12:22)  HR: 74 (05-01-24 @ 06:41) (33 - 90)  BP: 108/69 (05-01-24 @ 04:45) (107/70 - 148/96)  RR: 18 (05-01-24 @ 04:45) (18 - 18)  SpO2: 95% (05-01-24 @ 04:45) (94% - 99%)  Wt(kg): --  I&O's Summary      Appearance: Normal	  HEENT:   Normal oral mucosa, PERRL, EOMI	  Lymphatic: No lymphadenopathy  Cardiovascular: Normal S1 S2, No JVD,+ murmurs, No edema  Respiratory: rhonchi  Psychiatry: A & O x 3, Mood & affect appropriate  Gastrointestinal:  Soft, Non-tender, + BS	  Skin: No rashes, No ecchymoses, No cyanosis	  Neurologic: Non-focal  Extremities: Normal range of motion, No clubbing, cyanosis or edema  Vascular: Peripheral pulses palpable 2+ bilaterally    MEDICATIONS  (STANDING):  budesonide 160 MICROgram(s)/formoterol 4.5 MICROgram(s) Inhaler 2 Puff(s) Inhalation two times a day  calamine/zinc oxide Lotion 1 Application(s) Topical two times a day  cyproheptadine 4 milliGRAM(s) Oral two times a day  gabapentin 600 milliGRAM(s) Oral three times a day  heparin   Injectable 5000 Unit(s) SubCutaneous every 12 hours  mirabegron ER 25 milliGRAM(s) Oral daily  montelukast 10 milliGRAM(s) Oral daily  nystatin/triamcinolone Ointment 1 Application(s) Topical two times a day  polyethylene glycol 3350 17 Gram(s) Oral daily  senna 2 Tablet(s) Oral at bedtime  sodium chloride 0.9%. 1000 milliLiter(s) (75 mL/Hr) IV Continuous <Continuous>      TELEMETRY: 	    ECG:  	  RADIOLOGY:  OTHER: 	  	  LABS:	 	    CARDIAC MARKERS:            04-30    138  |  107  |  18  ----------------------------<  96  4.0   |  18<L>  |  0.93    Ca    9.0      30 Apr 2024 06:53  Mg     2.2     04-30      proBNP:   Lipid Profile:   HgA1c:   TSH: Thyroid Stimulating Hormone, Serum: 4.39 uIU/mL (04-23 @ 09:08)  Thyroid Stimulating Hormone, Serum: 4.13 uIU/mL (04-22 @ 18:50)    < from: NM PET/CT FDG Sarcoid Skull to Thigh (04.29.24 @ 15:23) >  1. No evidence of cardiac sarcoidosis.    2. No evidence of sarcoidosis on whole-body images.    3. Linear FDG uptake in the distal esophagus near the GE junction,   commonly inflammatory. This can be evaluated with GI consult/endoscopy if   clinically indicated.      Assessment and plan  ---------------------------  Pt is a 54yo M w/ PMH of asthma, chronic back pain pw intermittent chest discomfort and SOB.  Reports ~1 week's time of SOB and dizziness a/w intermittent chest discomfort for which he saw his PMD/cardiologist and underwent ECG notable for RBBB. Given his recent travel to [place] about a month ago he was advised to f/u w/ CTA PE protocol outpt to r/o PE given his symptoms which he has not gotten yet. On day of presentation he was at friend's home to and became very SOB and felt unwell when walking in and thus presented to [facility] [place] where he was noted to be in CHB and thus tx here to [facility].   He otherwise endorses having the flu about a month ago s/p tamiflu and a recent rash which he describes as blotches on his upper and lower extremities for which he saw a dermatologist and underwent 2 tapers of steroids which helped. He otherwise denies recent hiking though does report bike riding in the park, last in October of last year. Not on AV darcy blockade   pt with heart block ?etiology  check trop  agree with  lyme titer, negative  awaiting echo, noted  tsh normal  ct , no lymphoadenopathy, echo no regional wall motion abnormality lyme titer negative ?pet scan if can be done as inpatient  doubt reversible cause, awaiting PET scan   check HIV test, negative  start on dvt prophylaxis  PET noted, no cardiac sarcoidosis , distal esophagus abnormality ?GERD may need out pt EGD  s/p ppm dc today fu as out pt

## 2024-05-01 NOTE — PROGRESS NOTE ADULT - SUBJECTIVE AND OBJECTIVE BOX
24H hour events: ASVP w/ occ PVC    MEDICATIONS:  heparin   Injectable 5000 Unit(s) SubCutaneous every 12 hours      budesonide 160 MICROgram(s)/formoterol 4.5 MICROgram(s) Inhaler 2 Puff(s) Inhalation two times a day  montelukast 10 milliGRAM(s) Oral daily    acetaminophen     Tablet .. 650 milliGRAM(s) Oral every 6 hours PRN  gabapentin 600 milliGRAM(s) Oral three times a day  hydrOXYzine hydrochloride 25 milliGRAM(s) Oral daily PRN    polyethylene glycol 3350 17 Gram(s) Oral daily  senna 2 Tablet(s) Oral at bedtime      mirabegron ER 25 milliGRAM(s) Oral daily      REVIEW OF SYSTEMS:  See HPI, otherwise ROS negative.    PHYSICAL EXAM:  T(C): 36.8 (05-01-24 @ 04:45), Max: 36.9 (04-30-24 @ 12:22)  HR: 74 (05-01-24 @ 06:41) (33 - 90)  BP: 108/69 (05-01-24 @ 04:45) (107/70 - 148/96)  RR: 18 (05-01-24 @ 04:45) (18 - 18)  SpO2: 95% (05-01-24 @ 04:45) (94% - 99%)  Wt(kg): --  I&O's Summary      Appearance: Alert. NAD	  Cardiovascular: +S1S2 RRR no m/g/r  Respiratory: CTA B/L	  Psychiatry: A & O x 3, Mood & affect appropriate  Gastrointestinal:  Soft, NT. ND. +BS	  Skin: No rashes	masses or lesions  Site cdi w/o hematoma  Neurologic: Non-focal  Extremities: No edema BLE  Vascular: Peripheral pulses palpable 2+ bilaterally      LABS:	 	      04-30    138  |  107  |  18  ----------------------------<  96  4.0   |  18<L>  |  0.93    Ca    9.0      30 Apr 2024 06:53  Mg     2.2     04-30        proBNP: Pro-Brain Natriuretic Peptide: 504 pg/mL (04.22.24 @ 20:29)    TSH: Thyroid Stimulating Hormone, Serum: 4.39 uIU/mL (04.23.24 @ 09:08)    TELEMETRY: ASVP  	    RADIOLOGY: CXR PA/LAT w/ leads in appropriate position

## 2024-05-01 NOTE — DISCHARGE NOTE PROVIDER - PROVIDER TOKENS
PROVIDER:[TOKEN:[[id number]],FOLLOWUP:[2 weeks],ESTABLISHEDPATIENT:[T]],PROVIDER:[TOKEN:[[id number]],SCHEDULEDAPPT:[05/15/2024],SCHEDULEDAPPTTIME:[03:00 PM],ESTABLISHEDPATIENT:[T]]

## 2024-05-01 NOTE — DISCHARGE NOTE PROVIDER - NSDCFUSCHEDAPPT_GEN_ALL_CORE_FT
[facility]  [address]  Scheduled Appointment: 05/13/2024

## 2024-05-01 NOTE — PROGRESS NOTE ADULT - REASON FOR ADMISSION
CHB

## 2024-05-01 NOTE — DISCHARGE NOTE PROVIDER - HOSPITAL COURSE
HPI:  Pt is a 54yo M w/ PMH of asthma, chronic back pain pw intermittent chest discomfort and SOB.    Reports ~1 week's time of SOB and dizziness a/w intermittent chest discomfort for which he saw his PMD/cardiologist and underwent ECG notable for RBBB. Given his recent travel to [place] about a month ago he was advised to f/u w/ CTA PE protocol outpt to r/o PE given his symptoms which he has not gotten yet. On day of presentation he was at friend's home to and became very SOB and felt unwell when walking in and thus presented to [place] where he was noted to be in CHB and thus tx here to [facility].     He otherwise endorses having the flu about a month ago s/p tamiflu and a recent rash which he describes as blotches on his upper and lower extremities for which he saw a dermatologist and underwent 2 tapers of steroids which helped. He otherwise denies recent hiking though does report bike riding in the park, last in October of last year. Not on AV darcy blockade     In the ED darryl w/ mild leukocytosis to 15 s/p CTX 2g   (22 Apr 2024 23:45)    Hospital Course:  pt with heart block ?etiology   lyme titer, negative   CT no lymphoadenopathy, echo no regional wall motion abnormality lyme titer negative ?  PET noted, no cardiac sarcoidosis , distal esophagus abnormality ?GERD may need out pt EGD  HIV test, negative   He is S/p dual chamber PPM (Chang). Post procedure teaching performed, To follow up with EP as outpatient.  - CXR PA/LAT and EKG reviewed, interrogation performed by [employer] rep  WNL   Medically cleared for d/c home with follow up to Cardiology and EP.      Important Medication Changes and Reason:    Active or Pending Issues Requiring Follow-up:  Wound/EP follow up   Cards follow up    Advanced Directives:   [x ] Full code  [ ] DNR  [ ] Hospice    Discharge Diagnoses:  Complete heart block     54 yo male PMH of mild asthma, chronic back pain p/w intermittent chest discomfort and SOB. Reports ~1 week's time of SOB and dizziness a/w intermittent chest discomfort for which he saw his PMD/cardiologist and underwent ECG notable for new RBBB. Given his recent travel to [place] about a month ago he was advised to f/u w/ CTA PE protocol outpt to r/o PE given his symptoms which he has not gotten yet. On day of presentation he was at friend's home and became very SOB and felt unwell when walking in and thus presented to [place] [facility], he was told he had new CHB and was thus transferred to [facility] for PPM.     He otherwise endorses having the flu about a month ago s/p tamiflu and a recent rash which he describes as blotches on his upper and lower extremities for which he saw a dermatologist and underwent 2 tapers of steroids which helped. He otherwise denies recent hiking though does report bike riding in the park, last in October of last year. Not on AV darcy blockade agents on arrival.  In the ED darryl with mild non-specific leukocytosis to 15,000, gradually normalized without treatment.      Hospital Course: Patient has new evolving complete heart block, ? etiology. Lyme titer was negative.  CT no lymphoadenopathy, echo no regional wall motion abnormality, see  enclosed report.  Labs with CBC and SMA-7 later all WNL. No infections were found.     PET noted was recommended by EP service. No cardiac sarcoidosis was seen.  HIV test negative.     S/P dual chamber PPM (Chang). Post procedure teaching performed, To follow up with NS EP as outpatient. CXR PA/LAT and EKG reviewed, interrogation performed by [employer] rep  WNL, no PTX was seen.  Cleared for d/c home with follow up to Cardiology and EP. See enclosed med list, overall no clear cause was found for CHB.      52 yo male PMH of mild asthma, chronic back pain p/w intermittent chest discomfort and SOB. Reports ~1 week's time of SOB and dizziness a/w intermittent chest discomfort for which he saw his PMD/cardiologist and underwent ECG notable for new RBBB. Given his recent travel to [place] about a month ago he was advised to f/u w/ CTA PE protocol outpt to r/o PE given his symptoms which he has not gotten yet. On day of presentation he was at friend's home and became very SOB and felt unwell when walking in and thus presented to [place] [facility], he was told he had new CHB and was thus transferred to [facility] for PPM.     He otherwise endorses having the flu about a month ago s/p tamiflu and a recent rash which he describes as blotches on his upper and lower extremities for which he saw a dermatologist and underwent 2 tapers of steroids which helped. He otherwise denies recent hiking though does report bike riding in the park, last in October of last year. Not on AV darcy blockade agents on arrival.  In the ED darryl with mild non-specific leukocytosis to 15,000, gradually normalized without treatment.      Hospital Course: Patient has new evolving complete heart block, ? etiology. Lyme titer was negative.  CT chest no lymphoadenopathy, echo no regional wall motion abnormality, see  enclosed report.  Labs with CBC and SMA-7 later all WNL. No infections were found. RA sats all normal, pt declined CTA, felt was not needed.     PET noted was recommended by EP service. No cardiac sarcoidosis was seen.  HIV test negative.  Pt cannot tolerate a MRI.      S/P dual chamber PPM (Chang) was placed. Post procedure teaching performed, agrees to follow up with NS EP as outpatient. CXR PA/LAT and EKG reviewed, interrogation performed by [employer] rep, function WNL, no PTX was seen.      Cleared for d/c home with follow up to primary Cardiology and NS EP service. See enclosed med list, overall no clear cause was found for CHB.      54 yo male PMH of mild asthma, chronic back pain p/w intermittent chest discomfort and SOB. Reports ~1 week's time of SOB and dizziness a/w intermittent chest discomfort for which he saw his PMD/cardiologist and underwent ECG notable for new RBBB. Given his recent travel to [place] about a month ago he was advised to f/u w/ CTA PE protocol outpt to r/o PE given his symptoms which he has not gotten yet. On day of presentation he was at friend's home and became very SOB and felt unwell when walking in and thus presented to [place] [facility], he was told he had new CHB and was thus transferred to [facility] for PPM.     He otherwise endorses having the flu about a month ago s/p tamiflu and a recent rash which he describes as blotches on his upper and lower extremities for which he saw a dermatologist and underwent 2 tapers of steroids which helped. He otherwise denies recent hiking though does report bike riding in the park, last in October of last year. Not on AV darcy blockade agents on arrival.  In the ED darryl with mild non-specific leukocytosis to 15,000, gradually normalized without treatment.      Hospital Course: Patient has new evolving complete heart block, ? etiology. Lyme titer was negative.  CT chest no lymphoadenopathy, echo no regional wall motion abnormality,   see enclosed report.  Labs with CBC and SMA-7 later all WNL. No infections were found. RA sats all normal, pt declined CTA, felt was not needed.     PET noted was recommended by EP service. No cardiac sarcoidosis was seen.  HIV test negative.  Pt cannot tolerate a MRI.      S/P dual chamber PPM (Chang) was placed. Post procedure teaching performed, agrees to follow up with NS EP as outpatient. CXR PA/LAT and EKG reviewed, interrogation performed by [employer] rep, function WNL, no PTX was seen.      Cleared for d/c home with follow up to primary Cardiology and NS EP service. See enclosed med list, overall no clear cause was found for CHB.      52 yo male PMH of mild asthma, chronic back pain p/w intermittent chest discomfort and SOB. Reports ~1 week's time of SOB and dizziness a/w intermittent chest discomfort for which he saw his PMD/cardiologist and underwent ECG notable for new RBBB. Given his recent travel to [place] about a month ago he was advised to f/u w/ CTA PE protocol outpt to r/o PE given his symptoms which he has not gotten yet. On day of presentation he was at friend's home and became very SOB and felt unwell when walking in and thus presented to [place] [facility], he was told he had new CHB and was thus transferred to [facility] for PPM.     He otherwise endorses having the flu about a month ago s/p tamiflu and a recent rash which he describes as blotches on his upper and lower extremities for which he saw a dermatologist and underwent 2 tapers of steroids which helped. He otherwise denies recent hiking though does report bike riding in the park, last in October of last year. Not on AV darcy blockade agents on arrival.  In the ED darryl with mild non-specific leukocytosis to 15,000, gradually normalized without treatment.      Hospital Course: Patient has new evolving complete heart block, ? etiology. Lyme titer was negative.  CT chest no lymphoadenopathy, echo no regional wall motion   abnormality, see enclosed report.  Labs with CBC and SMA-7 later all WNL. No infections were found. RA sats all normal, pt declined CTA, felt was not needed.     PET noted was recommended by EP service. No cardiac sarcoidosis was seen.  HIV test negative.  Pt cannot tolerate a MRI.      S/P dual chamber PPM (Chang) was placed. Post procedure teaching performed, agrees to follow up with NS EP as outpatient. CXR PA/LAT and EKG reviewed, interrogation performed by [employer] rep, function WNL, no PTX was seen.      Cleared for d/c home with follow up to primary Cardiology and NS EP service. See enclosed med list, overall no clear cause was found for CHB.      54 yo male PMH of mild asthma, chronic back pain p/w intermittent chest discomfort and SOB. Reports ~1 week's time of SOB and dizziness a/w intermittent chest discomfort for which he saw his PMD/cardiologist and underwent ECG notable for new RBBB. Given his recent travel to [place] about a month ago he was advised to f/u w/ CTA PE protocol outpt to r/o PE given his symptoms which he has not gotten yet. On day of presentation he was at friend's home and became very SOB and felt unwell when walking in and thus presented to [place] [facility], he was told he had new CHB and was thus transferred to [facility] for PPM.     He otherwise endorses having the flu about a month ago s/p tamiflu and a recent rash which he describes as blotches on his upper and lower extremities for which he saw a dermatologist and underwent 2 tapers of steroids which helped. He otherwise denies recent hiking though does report bike riding in the park, last in October of last year. Not on AV darcy blockade agents on arrival.  In the ED darryl with mild non-specific leukocytosis to 15,000, gradually normalized without treatment.      Hospital Course: Patient has new evolving complete heart block, ? etiology. Lyme titer was negative.  CT chest no lymphoadenopathy, echo no regional wall motion abnormality, see enclosed report.  Labs with CBC and SMA-7 later all WNL. No infections were found. RA sats all normal, pt declined CTA, felt was not needed.     PET noted was recommended by EP service. No cardiac sarcoidosis was seen.  HIV test negative.  Pt cannot tolerate a MRI.      S/P dual chamber PPM (Chang) was placed. Post procedure teaching performed, agrees to follow up with NS EP as outpatient. CXR PA/LAT and EKG reviewed, interrogation performed by [employer] rep, function WNL, no PTX was seen.      Cleared for d/c home with follow up to primary Cardiology and NS EP service. See enclosed med list, overall no clear cause was found for CHB.

## 2024-05-01 NOTE — DISCHARGE NOTE NURSING/CASE MANAGEMENT/SOCIAL WORK - PATIENT PORTAL LINK FT
You can access the FollowMyHealth Patient Portal offered by [facility] by registering at the following website: [email or web address]. By joining [employer]’s FollowMyHealth portal, you will also be able to view your health information using other applications (apps) compatible with our system.

## 2024-05-02 ENCOUNTER — NON-APPOINTMENT (OUTPATIENT)
Age: 54
End: 2024-05-02

## 2024-05-13 ENCOUNTER — NON-APPOINTMENT (OUTPATIENT)
Age: 54
End: 2024-05-13

## 2024-05-13 ENCOUNTER — APPOINTMENT (OUTPATIENT)
Dept: ELECTROPHYSIOLOGY | Facility: CLINIC | Age: 54
End: 2024-05-13
Payer: COMMERCIAL

## 2024-05-13 VITALS
WEIGHT: 188 LBS | HEIGHT: 70 IN | OXYGEN SATURATION: 98 % | SYSTOLIC BLOOD PRESSURE: 120 MMHG | BODY MASS INDEX: 26.92 KG/M2 | HEART RATE: 83 BPM | DIASTOLIC BLOOD PRESSURE: 84 MMHG

## 2024-05-13 DIAGNOSIS — I44.2 ATRIOVENTRICULAR BLOCK, COMPLETE: ICD-10-CM

## 2024-05-13 PROCEDURE — 99024 POSTOP FOLLOW-UP VISIT: CPT

## 2024-05-13 PROCEDURE — 93280 PM DEVICE PROGR EVAL DUAL: CPT

## 2024-05-13 PROCEDURE — 93000 ELECTROCARDIOGRAM COMPLETE: CPT | Mod: 59

## 2024-05-17 ENCOUNTER — APPOINTMENT (OUTPATIENT)
Dept: PULMONOLOGY | Facility: CLINIC | Age: 54
End: 2024-05-17
Payer: COMMERCIAL

## 2024-05-17 VITALS
DIASTOLIC BLOOD PRESSURE: 72 MMHG | WEIGHT: 190 LBS | OXYGEN SATURATION: 97 % | SYSTOLIC BLOOD PRESSURE: 100 MMHG | TEMPERATURE: 97.6 F | HEART RATE: 99 BPM | HEIGHT: 69 IN | BODY MASS INDEX: 28.14 KG/M2

## 2024-05-17 DIAGNOSIS — R05.9 COUGH, UNSPECIFIED: ICD-10-CM

## 2024-05-17 DIAGNOSIS — Z78.9 OTHER SPECIFIED HEALTH STATUS: ICD-10-CM

## 2024-05-17 DIAGNOSIS — R06.02 SHORTNESS OF BREATH: ICD-10-CM

## 2024-05-17 DIAGNOSIS — R06.2 WHEEZING: ICD-10-CM

## 2024-05-17 PROCEDURE — 94060 EVALUATION OF WHEEZING: CPT

## 2024-05-17 PROCEDURE — 99203 OFFICE O/P NEW LOW 30 MIN: CPT | Mod: 25

## 2024-05-17 PROCEDURE — 94727 GAS DIL/WSHOT DETER LNG VOL: CPT

## 2024-05-17 PROCEDURE — 94729 DIFFUSING CAPACITY: CPT

## 2024-05-17 RX ORDER — MIRABEGRON 25 MG/1
25 TABLET, FILM COATED, EXTENDED RELEASE ORAL
Refills: 0 | Status: ACTIVE | COMMUNITY

## 2024-05-17 RX ORDER — BUDESONIDE AND FORMOTEROL FUMARATE DIHYDRATE 160; 4.5 UG/1; UG/1
160-4.5 AEROSOL RESPIRATORY (INHALATION)
Refills: 0 | Status: ACTIVE | COMMUNITY

## 2024-05-17 RX ORDER — MONTELUKAST 10 MG/1
10 TABLET, FILM COATED ORAL
Refills: 0 | Status: ACTIVE | COMMUNITY

## 2024-05-17 RX ORDER — FEXOFENADINE HYDROCHLORIDE AND PSEUDOEPHEDRINE HYDROCHLORIDE 180; 240 MG/1; MG/1
180-240 TABLET, FILM COATED, EXTENDED RELEASE ORAL
Refills: 0 | Status: ACTIVE | COMMUNITY

## 2024-05-17 RX ORDER — CYPROHEPTADINE HYDROCHLORIDE 4 MG/1
4 TABLET ORAL
Refills: 0 | Status: ACTIVE | COMMUNITY

## 2024-05-17 NOTE — REVIEW OF SYSTEMS
[Dyspnea] : dyspnea [SOB on Exertion] : sob on exertion [Negative] : Endocrine [Cough] : no cough [Sputum] : no sputum [GERD] : gerd

## 2024-05-17 NOTE — HISTORY OF PRESENT ILLNESS
[Never] : never [TextBox_4] : 53-year-old male presents for evaluation of increasing dyspnea for 1 year.  Patient denies fever, chills, chest pain, hemoptysis, night sweats or weight loss.  Patient has been on Symbicort twice daily with daily montelukast and occasional albuterol MDI as well as Allegra D "for years".  He has never been hospitalized for any respiratory issues despite above treatment nor has he been evaluated by pulmonary in the past.  He had insertion of PPM last month secondary to heart block.  [TextBox_29] : Denies snoring, daytime somnolence, apneic episodes, AM headaches

## 2024-05-17 NOTE — DISCUSSION/SUMMARY
[FreeTextEntry1] : 53-year-old male with history of "asthma" with SOB.  I reviewed the PFT results with the patient.  Given the bronchodilator response, Symbicort was replaced by Trelegy 200 for daily use with continued use of montelukast daily and albuterol as needed.  GERD treatment was also stressed.  He is to follow-up with his PMD as before.

## 2024-05-17 NOTE — CONSULT LETTER
[Dear  ___] : Dear  [unfilled], [Courtesy Letter:] : I had the pleasure of seeing your patient, [unfilled], in my office today. [Please see my note below.] : Please see my note below. [Consult Closing:] : Thank you very much for allowing me to participate in the care of this patient.  If you have any questions, please do not hesitate to contact me. [Sincerely,] : Sincerely, The patient is a 80y Female complaining of back pain/injury.

## 2024-05-20 ENCOUNTER — APPOINTMENT (OUTPATIENT)
Dept: DERMATOLOGY | Facility: CLINIC | Age: 54
End: 2024-05-20

## 2024-05-22 RX ORDER — FLUTICASONE FUROATE, UMECLIDINIUM BROMIDE AND VILANTEROL TRIFENATATE 200; 62.5; 25 UG/1; UG/1; UG/1
200-62.5-25 POWDER RESPIRATORY (INHALATION)
Qty: 1 | Refills: 3 | Status: ACTIVE | COMMUNITY
Start: 2024-05-22 | End: 1900-01-01

## 2024-06-05 RX ORDER — PREDNISONE 20 MG/1
20 TABLET ORAL
Qty: 10 | Refills: 0 | Status: ACTIVE | COMMUNITY
Start: 2024-06-05 | End: 1900-01-01

## 2024-07-05 NOTE — PATIENT PROFILE ADULT - FUNCTIONAL ASSESSMENT - DAILY ACTIVITY 3.
Addended by: ELIEL CALHOUN on: 7/4/2024 08:44 PM     Modules accepted: Orders     4 = No assist / stand by assistance

## 2024-07-24 ENCOUNTER — APPOINTMENT (OUTPATIENT)
Dept: DERMATOLOGY | Facility: CLINIC | Age: 54
End: 2024-07-24
Payer: COMMERCIAL

## 2024-07-24 VITALS — WEIGHT: 180 LBS | BODY MASS INDEX: 25.77 KG/M2 | HEIGHT: 70 IN

## 2024-07-24 DIAGNOSIS — L30.9 DERMATITIS, UNSPECIFIED: ICD-10-CM

## 2024-07-24 DIAGNOSIS — L57.0 ACTINIC KERATOSIS: ICD-10-CM

## 2024-07-24 DIAGNOSIS — L85.3 XEROSIS CUTIS: ICD-10-CM

## 2024-07-24 PROCEDURE — 99214 OFFICE O/P EST MOD 30 MIN: CPT | Mod: 25

## 2024-07-24 PROCEDURE — 17003 DESTRUCT PREMALG LES 2-14: CPT

## 2024-07-24 PROCEDURE — 17000 DESTRUCT PREMALG LESION: CPT

## 2024-07-24 PROCEDURE — 99204 OFFICE O/P NEW MOD 45 MIN: CPT | Mod: 25

## 2024-07-24 RX ORDER — MOMETASONE FUROATE 1 MG/G
0.1 OINTMENT TOPICAL
Qty: 1 | Refills: 3 | Status: ACTIVE | COMMUNITY
Start: 2024-07-24 | End: 1900-01-01

## 2024-07-24 NOTE — HISTORY OF PRESENT ILLNESS
[FreeTextEntry1] : npa - rash, spots [de-identified] : Pt is a 53 year old M presenting for  Problem: itchy rash Location: upper arms, shoulders, back Duration: years, but not present today, flares every few months Associated sx/Aggravating factors: itchy Modifying factors/Treatments: was seen in hosp for rash, diagnosed as eczematous, was given TAC. Does not think TAC helped. moiturizing w Eucerin once daily.   Problem: scab  Location: vertex scalp Duration: a few months Associated sx/Aggravating factors: not healing over a few months, pt picks at it at times Modifying factors/Treatments: none  Personal hx of skin cancer: none FHx of skin cancer: none SH: middle school [employer] in [place]

## 2024-07-24 NOTE — ASSESSMENT
[FreeTextEntry1] : #Eczematous Dermatitis - new dx of uncertain prognosis; DX based on recent inpatient consult Failed triamcinolone per patient - Diagnosis, chronic nature, disease course, treatment options and goals of therapy discussed - Start Mometasone 0.1% ointment to affected areas BID for 2 weeks, then take 1 week off, repeat PRN. Do not apply to face/groin. Do not use for more than 2 weeks at a time, with 1 week off in between. Side effects discussed with pt including but not limited to thinning of the skin, atrophy and dyspigmentation. - RTC PRN if flaring   #Xerosis - Principles of dry skin care reviewed including: importance of using an emollient 2-3x/day, using gentle products, and avoiding fragranced products including soaps and detergent  #Actinic Keratoses, scalp - pt counseled on pre-cancerous nature of this lesion - Treated 3 lesions  w/ LN2 X 2 cycles The risks/benefits/alternatives of tx was explained to the patient which include but are not limited to redness, pain, blistering, scar, discoloration of skin, and recurrence. The patient expressed understanding of these risks and agreed to the procedure. The procedure was well tolerated, without complication. - pt counseled on importance of sun protection, SPF30 or higher and need for reapplication q2hrs when outside  RTC for TBSE

## 2024-07-24 NOTE — PHYSICAL EXAM
[Alert] : alert [Oriented x 3] : ~L oriented x 3 [Well Nourished] : well nourished [Conjunctiva Non-injected] : conjunctiva non-injected [No Visual Lymphadenopathy] : no visual  lymphadenopathy [No Clubbing] : no clubbing [No Edema] : no edema [No Bromhidrosis] : no bromhidrosis [No Chromhidrosis] : no chromhidrosis [Declined] : declined [FreeTextEntry3] : xerosis rough, gritty macules on vertex scalp (3), one that is excoriated  no active rash on arms or torso

## 2024-07-24 NOTE — HISTORY OF PRESENT ILLNESS
[FreeTextEntry1] : npa - rash, spots [de-identified] : Pt is a 53 year old M presenting for  Problem: itchy rash Location: upper arms, shoulders, back Duration: years, but not present today, flares every few months Associated sx/Aggravating factors: itchy Modifying factors/Treatments: was seen in hosp for rash, diagnosed as eczematous, was given TAC. Does not think TAC helped. moiturizing w Eucerin once daily.   Problem: scab  Location: vertex scalp Duration: a few months Associated sx/Aggravating factors: not healing over a few months, pt picks at it at times Modifying factors/Treatments: none  Personal hx of skin cancer: none FHx of skin cancer: none SH: middle school [employer] in [place]

## 2024-08-25 ENCOUNTER — RESULT CHARGE (OUTPATIENT)
Age: 54
End: 2024-08-25

## 2024-08-26 ENCOUNTER — NON-APPOINTMENT (OUTPATIENT)
Age: 54
End: 2024-08-26

## 2024-08-26 ENCOUNTER — APPOINTMENT (OUTPATIENT)
Dept: ELECTROPHYSIOLOGY | Facility: CLINIC | Age: 54
End: 2024-08-26
Payer: COMMERCIAL

## 2024-08-26 VITALS — OXYGEN SATURATION: 100 % | SYSTOLIC BLOOD PRESSURE: 112 MMHG | HEART RATE: 85 BPM | DIASTOLIC BLOOD PRESSURE: 78 MMHG

## 2024-08-26 PROCEDURE — 93000 ELECTROCARDIOGRAM COMPLETE: CPT | Mod: 59

## 2024-08-26 PROCEDURE — 93280 PM DEVICE PROGR EVAL DUAL: CPT

## 2024-09-10 ENCOUNTER — APPOINTMENT (OUTPATIENT)
Dept: PULMONOLOGY | Facility: CLINIC | Age: 54
End: 2024-09-10
Payer: COMMERCIAL

## 2024-09-10 VITALS
BODY MASS INDEX: 26.69 KG/M2 | SYSTOLIC BLOOD PRESSURE: 114 MMHG | HEART RATE: 96 BPM | WEIGHT: 186 LBS | TEMPERATURE: 97.3 F | OXYGEN SATURATION: 98 % | DIASTOLIC BLOOD PRESSURE: 76 MMHG

## 2024-09-10 PROCEDURE — 99214 OFFICE O/P EST MOD 30 MIN: CPT

## 2024-09-10 RX ORDER — BUDESONIDE, GLYCOPYRROLATE, AND FORMOTEROL FUMARATE 160; 9; 4.8 UG/1; UG/1; UG/1
160-9-4.8 AEROSOL, METERED RESPIRATORY (INHALATION)
Qty: 3 | Refills: 3 | Status: ACTIVE | COMMUNITY
Start: 2024-09-10 | End: 1900-01-01

## 2024-09-15 NOTE — REVIEW OF SYSTEMS
[Postnasal Drip] : postnasal drip [Nasal Congestion] : nasal congestion [Cough] : no cough [Sputum] : no sputum [Dyspnea] : dyspnea [SOB on Exertion] : sob on exertion [GERD] : gerd [Negative] : Endocrine

## 2024-09-15 NOTE — DISCUSSION/SUMMARY
[FreeTextEntry1] : 54-year-old male with history of hyperreactive airways disease and rhinitis at baseline.  He is continue Breztri twice daily with montelukast daily and albuterol as needed.  Nasal saline as needed was also recommended.  He is to follow-up with his PMD as before and for the influenza vaccine.

## 2024-09-15 NOTE — REVIEW OF SYSTEMS
[Nasal Congestion] : nasal congestion [Postnasal Drip] : postnasal drip [Cough] : no cough [Sputum] : no sputum [Dyspnea] : dyspnea [SOB on Exertion] : sob on exertion [GERD] : gerd [Negative] : Endocrine

## 2024-09-15 NOTE — HISTORY OF PRESENT ILLNESS
[Never] : never [TextBox_4] : 54-year-old male with history of hyperreactive airways disease presents for follow-up.  The patient's breathing has been "better" without cough, chest pain, hemoptysis, night sweats or weight loss.  He does complain of occasional postnasal drip with congestion.  He uses ICS/LABA/LAMA daily with montelukast and occasional albuterol MDI use. [TextBox_29] : Denies snoring, daytime somnolence, apneic episodes, AM headaches

## 2024-11-27 ENCOUNTER — NON-APPOINTMENT (OUTPATIENT)
Age: 54
End: 2024-11-27

## 2024-11-27 ENCOUNTER — APPOINTMENT (OUTPATIENT)
Dept: ELECTROPHYSIOLOGY | Facility: CLINIC | Age: 54
End: 2024-11-27
Payer: COMMERCIAL

## 2024-11-27 PROCEDURE — 93294 REM INTERROG EVL PM/LDLS PM: CPT

## 2024-11-27 PROCEDURE — 93296 REM INTERROG EVL PM/IDS: CPT

## 2024-12-12 RX ORDER — PREDNISONE 20 MG/1
20 TABLET ORAL
Qty: 10 | Refills: 0 | Status: ACTIVE | COMMUNITY
Start: 2024-12-12 | End: 1900-01-01

## 2025-01-13 NOTE — DISCHARGE NOTE NURSING/CASE MANAGEMENT/SOCIAL WORK - NSDCPEFALRISK_GEN_ALL_CORE
1 For information on Fall & Injury Prevention, visit: [email or web address] OR  [email or web address] OR  https://www.cdc.gov/steadi/patient.html Principal Discharge DX:	Viral syndrome

## 2025-02-24 ENCOUNTER — NON-APPOINTMENT (OUTPATIENT)
Age: 55
End: 2025-02-24

## 2025-02-24 ENCOUNTER — APPOINTMENT (OUTPATIENT)
Dept: ELECTROPHYSIOLOGY | Facility: CLINIC | Age: 55
End: 2025-02-24

## 2025-02-24 VITALS
BODY MASS INDEX: 26.11 KG/M2 | SYSTOLIC BLOOD PRESSURE: 109 MMHG | WEIGHT: 182 LBS | HEART RATE: 93 BPM | OXYGEN SATURATION: 97 % | DIASTOLIC BLOOD PRESSURE: 74 MMHG

## 2025-02-24 DIAGNOSIS — I49.3 VENTRICULAR PREMATURE DEPOLARIZATION: ICD-10-CM

## 2025-02-24 PROCEDURE — 93280 PM DEVICE PROGR EVAL DUAL: CPT

## 2025-02-24 PROCEDURE — 93000 ELECTROCARDIOGRAM COMPLETE: CPT | Mod: 59

## 2025-02-24 RX ORDER — METOPROLOL SUCCINATE 25 MG/1
25 TABLET, EXTENDED RELEASE ORAL DAILY
Qty: 90 | Refills: 1 | Status: ACTIVE | COMMUNITY
Start: 2025-02-24 | End: 1900-01-01

## 2025-02-26 ENCOUNTER — APPOINTMENT (OUTPATIENT)
Dept: ELECTROPHYSIOLOGY | Facility: CLINIC | Age: 55
End: 2025-02-26

## 2025-03-21 PROCEDURE — 93248 EXT ECG>7D<15D REV&INTERPJ: CPT

## 2025-03-26 ENCOUNTER — APPOINTMENT (OUTPATIENT)
Dept: ELECTROPHYSIOLOGY | Facility: CLINIC | Age: 55
End: 2025-03-26
Payer: COMMERCIAL

## 2025-03-26 ENCOUNTER — NON-APPOINTMENT (OUTPATIENT)
Age: 55
End: 2025-03-26

## 2025-03-26 VITALS
WEIGHT: 180 LBS | HEIGHT: 70 IN | SYSTOLIC BLOOD PRESSURE: 115 MMHG | BODY MASS INDEX: 25.77 KG/M2 | DIASTOLIC BLOOD PRESSURE: 76 MMHG | RESPIRATION RATE: 14 BRPM | OXYGEN SATURATION: 98 % | HEART RATE: 76 BPM

## 2025-03-26 DIAGNOSIS — I44.2 ATRIOVENTRICULAR BLOCK, COMPLETE: ICD-10-CM

## 2025-03-26 DIAGNOSIS — I49.3 VENTRICULAR PREMATURE DEPOLARIZATION: ICD-10-CM

## 2025-03-26 PROCEDURE — 99214 OFFICE O/P EST MOD 30 MIN: CPT | Mod: 25

## 2025-03-26 PROCEDURE — 93000 ELECTROCARDIOGRAM COMPLETE: CPT | Mod: 59

## 2025-03-26 PROCEDURE — 93288 INTERROG EVL PM/LDLS PM IP: CPT

## 2025-03-29 ENCOUNTER — EMERGENCY (EMERGENCY)
Facility: HOSPITAL | Age: 55
LOS: 1 days | Discharge: HOME | End: 2025-03-29
Attending: EMERGENCY MEDICINE
Payer: COMMERCIAL

## 2025-03-29 VITALS
OXYGEN SATURATION: 98 % | TEMPERATURE: 98 F | RESPIRATION RATE: 18 BRPM | SYSTOLIC BLOOD PRESSURE: 130 MMHG | HEART RATE: 73 BPM | DIASTOLIC BLOOD PRESSURE: 87 MMHG

## 2025-03-29 DIAGNOSIS — Z90.49 ACQUIRED ABSENCE OF OTHER SPECIFIED PARTS OF DIGESTIVE TRACT: Chronic | ICD-10-CM

## 2025-03-29 LAB
ALBUMIN SERPL ELPH-MCNC: 4.4 G/DL — SIGNIFICANT CHANGE UP (ref 3.3–5)
ALP SERPL-CCNC: 109 U/L — SIGNIFICANT CHANGE UP (ref 40–120)
ALT FLD-CCNC: 23 U/L — SIGNIFICANT CHANGE UP (ref 10–45)
ANION GAP SERPL CALC-SCNC: 15 MMOL/L — SIGNIFICANT CHANGE UP (ref 5–17)
AST SERPL-CCNC: 20 U/L — SIGNIFICANT CHANGE UP (ref 10–40)
BASOPHILS # BLD AUTO: 0.04 K/UL — SIGNIFICANT CHANGE UP (ref 0–0.2)
BASOPHILS NFR BLD AUTO: 0.4 % — SIGNIFICANT CHANGE UP (ref 0–2)
BILIRUB SERPL-MCNC: 0.6 MG/DL — SIGNIFICANT CHANGE UP (ref 0.2–1.2)
BUN SERPL-MCNC: 14 MG/DL — SIGNIFICANT CHANGE UP (ref 7–23)
CALCIUM SERPL-MCNC: 9.5 MG/DL — SIGNIFICANT CHANGE UP (ref 8.4–10.5)
CHLORIDE SERPL-SCNC: 106 MMOL/L — SIGNIFICANT CHANGE UP (ref 96–108)
CO2 SERPL-SCNC: 23 MMOL/L — SIGNIFICANT CHANGE UP (ref 22–31)
CREAT SERPL-MCNC: 0.98 MG/DL — SIGNIFICANT CHANGE UP (ref 0.5–1.3)
EGFR: 91 ML/MIN/1.73M2 — SIGNIFICANT CHANGE UP
EGFR: 91 ML/MIN/1.73M2 — SIGNIFICANT CHANGE UP
EOSINOPHIL # BLD AUTO: 0.11 K/UL — SIGNIFICANT CHANGE UP (ref 0–0.5)
EOSINOPHIL NFR BLD AUTO: 1.1 % — SIGNIFICANT CHANGE UP (ref 0–6)
GLUCOSE SERPL-MCNC: 89 MG/DL — SIGNIFICANT CHANGE UP (ref 70–99)
HCT VFR BLD CALC: 46.7 % — SIGNIFICANT CHANGE UP (ref 39–50)
HGB BLD-MCNC: 16 G/DL — SIGNIFICANT CHANGE UP (ref 13–17)
IMM GRANULOCYTES NFR BLD AUTO: 0.3 % — SIGNIFICANT CHANGE UP (ref 0–0.9)
LIDOCAIN IGE QN: 13 U/L — SIGNIFICANT CHANGE UP (ref 7–60)
LYMPHOCYTES # BLD AUTO: 2.37 K/UL — SIGNIFICANT CHANGE UP (ref 1–3.3)
LYMPHOCYTES # BLD AUTO: 24.4 % — SIGNIFICANT CHANGE UP (ref 13–44)
MAGNESIUM SERPL-MCNC: 2.2 MG/DL — SIGNIFICANT CHANGE UP (ref 1.6–2.6)
MCHC RBC-ENTMCNC: 30 PG — SIGNIFICANT CHANGE UP (ref 27–34)
MCHC RBC-ENTMCNC: 34.3 G/DL — SIGNIFICANT CHANGE UP (ref 32–36)
MCV RBC AUTO: 87.5 FL — SIGNIFICANT CHANGE UP (ref 80–100)
MONOCYTES # BLD AUTO: 0.77 K/UL — SIGNIFICANT CHANGE UP (ref 0–0.9)
MONOCYTES NFR BLD AUTO: 7.9 % — SIGNIFICANT CHANGE UP (ref 2–14)
NEUTROPHILS # BLD AUTO: 6.38 K/UL — SIGNIFICANT CHANGE UP (ref 1.8–7.4)
NEUTROPHILS NFR BLD AUTO: 65.9 % — SIGNIFICANT CHANGE UP (ref 43–77)
NRBC BLD AUTO-RTO: 0 /100 WBCS — SIGNIFICANT CHANGE UP (ref 0–0)
PLATELET # BLD AUTO: 224 K/UL — SIGNIFICANT CHANGE UP (ref 150–400)
POTASSIUM SERPL-MCNC: 3.6 MMOL/L — SIGNIFICANT CHANGE UP (ref 3.5–5.3)
POTASSIUM SERPL-SCNC: 3.6 MMOL/L — SIGNIFICANT CHANGE UP (ref 3.5–5.3)
PROT SERPL-MCNC: 6.9 G/DL — SIGNIFICANT CHANGE UP (ref 6–8.3)
RBC # BLD: 5.34 M/UL — SIGNIFICANT CHANGE UP (ref 4.2–5.8)
SODIUM SERPL-SCNC: 144 MMOL/L — SIGNIFICANT CHANGE UP (ref 135–145)
TROPONIN T, HIGH SENSITIVITY RESULT: 8 NG/L — SIGNIFICANT CHANGE UP (ref 0–51)
WBC # BLD: 9.7 K/UL — SIGNIFICANT CHANGE UP (ref 3.8–10.5)
WBC # FLD AUTO: 9.7 K/UL — SIGNIFICANT CHANGE UP (ref 3.8–10.5)

## 2025-03-29 PROCEDURE — 70496 CT ANGIOGRAPHY HEAD: CPT | Mod: MC

## 2025-03-29 PROCEDURE — 70498 CT ANGIOGRAPHY NECK: CPT | Mod: MC

## 2025-03-29 PROCEDURE — 71045 X-RAY EXAM CHEST 1 VIEW: CPT

## 2025-03-29 PROCEDURE — 93005 ELECTROCARDIOGRAM TRACING: CPT

## 2025-03-29 PROCEDURE — 83690 ASSAY OF LIPASE: CPT

## 2025-03-29 PROCEDURE — 70498 CT ANGIOGRAPHY NECK: CPT | Mod: 26

## 2025-03-29 PROCEDURE — 82962 GLUCOSE BLOOD TEST: CPT

## 2025-03-29 PROCEDURE — 93010 ELECTROCARDIOGRAM REPORT: CPT

## 2025-03-29 PROCEDURE — 70450 CT HEAD/BRAIN W/O DYE: CPT | Mod: 26,59

## 2025-03-29 PROCEDURE — 99285 EMERGENCY DEPT VISIT HI MDM: CPT | Mod: 25

## 2025-03-29 PROCEDURE — 85025 COMPLETE CBC W/AUTO DIFF WBC: CPT

## 2025-03-29 PROCEDURE — 96375 TX/PRO/DX INJ NEW DRUG ADDON: CPT | Mod: XU

## 2025-03-29 PROCEDURE — 99285 EMERGENCY DEPT VISIT HI MDM: CPT

## 2025-03-29 PROCEDURE — 84484 ASSAY OF TROPONIN QUANT: CPT

## 2025-03-29 PROCEDURE — 70496 CT ANGIOGRAPHY HEAD: CPT | Mod: 26

## 2025-03-29 PROCEDURE — 96374 THER/PROPH/DIAG INJ IV PUSH: CPT | Mod: XU

## 2025-03-29 PROCEDURE — 80053 COMPREHEN METABOLIC PANEL: CPT

## 2025-03-29 PROCEDURE — 83735 ASSAY OF MAGNESIUM: CPT

## 2025-03-29 PROCEDURE — 71045 X-RAY EXAM CHEST 1 VIEW: CPT | Mod: 26

## 2025-03-29 PROCEDURE — 70450 CT HEAD/BRAIN W/O DYE: CPT | Mod: MC

## 2025-03-29 RX ORDER — DIPHENHYDRAMINE HCL 12.5MG/5ML
25 ELIXIR ORAL ONCE
Refills: 0 | Status: COMPLETED | OUTPATIENT
Start: 2025-03-29 | End: 2025-03-29

## 2025-03-29 RX ORDER — MECLIZINE HCL 12.5 MG
25 TABLET ORAL ONCE
Refills: 0 | Status: COMPLETED | OUTPATIENT
Start: 2025-03-29 | End: 2025-03-29

## 2025-03-29 RX ORDER — METHYLPREDNISOLONE ACETATE 80 MG/ML
125 INJECTION, SUSPENSION INTRA-ARTICULAR; INTRALESIONAL; INTRAMUSCULAR; SOFT TISSUE ONCE
Refills: 0 | Status: COMPLETED | OUTPATIENT
Start: 2025-03-29 | End: 2025-03-29

## 2025-03-29 RX ORDER — METOCLOPRAMIDE HCL 10 MG
10 TABLET ORAL ONCE
Refills: 0 | Status: COMPLETED | OUTPATIENT
Start: 2025-03-29 | End: 2025-03-29

## 2025-03-29 RX ADMIN — Medication 10 MILLIGRAM(S): at 19:33

## 2025-03-29 RX ADMIN — Medication 25 MILLIGRAM(S): at 16:48

## 2025-03-29 RX ADMIN — Medication 1000 MILLILITER(S): at 16:48

## 2025-03-29 RX ADMIN — METHYLPREDNISOLONE ACETATE 125 MILLIGRAM(S): 80 INJECTION, SUSPENSION INTRA-ARTICULAR; INTRALESIONAL; INTRAMUSCULAR; SOFT TISSUE at 20:03

## 2025-03-29 RX ADMIN — Medication 25 MILLIGRAM(S): at 18:06

## 2025-03-29 RX ADMIN — Medication 25 MILLIGRAM(S): at 20:03

## 2025-03-29 NOTE — ED PROVIDER NOTE - PROGRESS NOTE DETAILS
Aide Payne M.D. (Resident Physician): Dizziness has resolved. CT non-actionable. Will dc with ENT f/u.

## 2025-03-29 NOTE — ED PROVIDER NOTE - PATIENT PORTAL LINK FT
You can access the FollowMyHealth Patient Portal offered by [facility] by registering at the following website: [email or web address]. By joining [email or web address]’s FollowMyHealth portal, you will also be able to view your health information using other applications (apps) compatible with our system.

## 2025-03-29 NOTE — ED ADULT TRIAGE NOTE - CHIEF COMPLAINT QUOTE
n/v dizzy thursday room spinning with severe n/v  went away and then  at 1130 last night room started to become dizzy again with spinning   Chest pain Pace maker x 1 year ago s/p heart block  FS 97   Denies blood thinners

## 2025-03-29 NOTE — ED PROVIDER NOTE - NSFOLLOWUPINSTRUCTIONS_ED_ALL_ED_FT
You have been evaluated in the Emergency Department today for dizziness. Your evaluation showed that you likely have vertigo.     Please  and take the Meclizine if needed for dizziness.    The Hospital will call in a couple days to schedule an appointment with an ENT doctor.    Return to the Emergency Department if you experience worsening dizziness, recurrent vomiting, weakness, numbness/tingling, or any other concerning symptoms.    Thank you for choosing us for your care.

## 2025-03-29 NOTE — ED PROVIDER NOTE - OBJECTIVE STATEMENT
54-year-old male history of pacemaker placement 1 year ago for "complete heart block" presents to emergency department today for intermittent episodes of nausea vomiting and room spinning dizziness that began 3 days ago and epigastric pain and general malaise.  No associated fevers, chills, shortness of breath, cough, other complaints at this time.

## 2025-03-29 NOTE — ED ADULT NURSE NOTE - NSSEPSISSUSPECTED_ED_A_ED
INR: 2.8 on 12/26/18  Reported by GLENIS Dubon from [facility].  Please contact Jagdish at [phone] to relay dosing instructions.    Patient reports taking 7.5mg on Mon/Wed/Fri and 5mg x 4 days of warfarin using 2.5mg and 7.5mg tabs.  Patient states there have been no changes in diet or medications, physical activity, or alcohol.   Patient denies any recent illnesses, fever, nausea, vomiting or diarrhea.       No

## 2025-03-29 NOTE — ED PROVIDER NOTE - ATTENDING CONTRIBUTION TO CARE
54-year-old male with recent pacemaker insertion for heart block presenting with acute dizziness, worse with head movements, especially with gaze to the left, associated with nausea and vomiting, presents to the ED.  No priors like this.  Denies any fevers, no chest pain.  Denies any diplopia, dysphagia, dysarthria, weakness of the arm or leg, or numbness.    Physical exam: Patient well-appearing, nontoxic, no respiratory distress, appears slightly anxious, heart regular rate and rhythm, abdomen soft nontender, clear lungs, has positive horizontal nystagmus with left gaze that fatigues, slight to the right as well, no rotary nystagmus, regular cranial nerves are intact, gait deferred due to intense vertigo.    MDM: 54-year-old male with acute positional vertigo, obtain labs, CBC rule out anemia, chemistries rule out metabolic disturbance, start with meclizine for symptomatic relief of vertigo, consider antiemetics and IV fluids, and reassess.  At this time suspicion for central causes of vertigo is low, however patient at a slight increased risk due to recent procedure.    Progress note 2100: Patient received multiple doses of meclizine, antiemetics, patient appears more anxious than before, still vertiginous, in the abundance of caution and as above, will obtain CT scan of the head and CT angio of the head and neck to evaluate for possible subtle stroke, will give steroids which can help with both nausea and with vertigo, will give Benadryl IV for possible relief of nausea and to help with anxiety, will consider Valium next if these do not help, and continue to observe.

## 2025-03-29 NOTE — ED ADULT NURSE NOTE - CHIEF COMPLAINT QUOTE
n/v dizzy thursday room spinning with severe n/v  went away and then  at 1130 last night room started to become dizzy again with spinning   Chest pain Pace maker x 1 year ago s/p heart block  FS 97   Denies blood thinners Authored by Resident/PA/NP

## 2025-03-29 NOTE — ED ADULT NURSE NOTE - OBJECTIVE STATEMENT
54 y.o male, A&Ox4, PMH complete heart block s/p pacemaker 2024, pt presents to ED c/o dizziness. pt states on Thursday he had an episode of dizziness with vomiting and states it went away but then yesterday the dizziness returned which pt describes as room spinning which he states he is still dizzy, pt denies vomiting yesterday or today. pt endorses having fluttering in his chest since having pacemaker placed which he states he went to cardiologist Wednesday and was told everything was "fine". pt denies chest pain, sob, N/V/D, fevers, chills at this time. IV access established, cardiac monitor placed, pt safety and comfort provided.

## 2025-03-30 VITALS
HEART RATE: 83 BPM | OXYGEN SATURATION: 95 % | TEMPERATURE: 98 F | DIASTOLIC BLOOD PRESSURE: 77 MMHG | RESPIRATION RATE: 20 BRPM | SYSTOLIC BLOOD PRESSURE: 123 MMHG

## 2025-03-30 RX ORDER — MECLIZINE HCL 12.5 MG
1 TABLET ORAL
Qty: 56 | Refills: 0
Start: 2025-03-30 | End: 2025-04-12

## 2025-06-26 ENCOUNTER — APPOINTMENT (OUTPATIENT)
Dept: ELECTROPHYSIOLOGY | Facility: CLINIC | Age: 55
End: 2025-06-26

## 2025-06-26 PROCEDURE — 93296 REM INTERROG EVL PM/IDS: CPT

## 2025-06-26 PROCEDURE — 93294 REM INTERROG EVL PM/LDLS PM: CPT

## 2025-06-30 ENCOUNTER — RX RENEWAL (OUTPATIENT)
Age: 55
End: 2025-06-30

## 2025-09-03 ENCOUNTER — APPOINTMENT (OUTPATIENT)
Dept: ELECTROPHYSIOLOGY | Facility: CLINIC | Age: 55
End: 2025-09-03
Payer: COMMERCIAL

## 2025-09-03 VITALS — OXYGEN SATURATION: 99 % | HEART RATE: 74 BPM | SYSTOLIC BLOOD PRESSURE: 108 MMHG | DIASTOLIC BLOOD PRESSURE: 76 MMHG

## 2025-09-03 DIAGNOSIS — I49.3 VENTRICULAR PREMATURE DEPOLARIZATION: ICD-10-CM

## 2025-09-03 DIAGNOSIS — I44.2 ATRIOVENTRICULAR BLOCK, COMPLETE: ICD-10-CM

## 2025-09-03 PROCEDURE — 99214 OFFICE O/P EST MOD 30 MIN: CPT | Mod: 25

## 2025-09-03 PROCEDURE — 93288 INTERROG EVL PM/LDLS PM IP: CPT | Mod: 59

## 2025-09-03 PROCEDURE — 93000 ELECTROCARDIOGRAM COMPLETE: CPT | Mod: 59
